# Patient Record
Sex: FEMALE
[De-identification: names, ages, dates, MRNs, and addresses within clinical notes are randomized per-mention and may not be internally consistent; named-entity substitution may affect disease eponyms.]

---

## 2018-07-03 ENCOUNTER — HOSPITAL ENCOUNTER (INPATIENT)
Dept: HOSPITAL 31 - C.ER | Age: 68
LOS: 6 days | Discharge: HOME HEALTH | DRG: 291 | End: 2018-07-09
Attending: INTERNAL MEDICINE | Admitting: INTERNAL MEDICINE
Payer: MEDICARE

## 2018-07-03 VITALS — BODY MASS INDEX: 30.4 KG/M2

## 2018-07-03 DIAGNOSIS — J18.9: ICD-10-CM

## 2018-07-03 DIAGNOSIS — D64.9: ICD-10-CM

## 2018-07-03 DIAGNOSIS — N18.6: ICD-10-CM

## 2018-07-03 DIAGNOSIS — Z94.0: ICD-10-CM

## 2018-07-03 DIAGNOSIS — I27.20: ICD-10-CM

## 2018-07-03 DIAGNOSIS — J96.90: ICD-10-CM
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
## 2018-07-03 DIAGNOSIS — Z99.2: ICD-10-CM

## 2018-07-03 DIAGNOSIS — J44.0: ICD-10-CM

## 2018-07-03 DIAGNOSIS — I50.9: ICD-10-CM

## 2018-07-03 DIAGNOSIS — D69.6: ICD-10-CM

## 2018-07-03 DIAGNOSIS — E78.00: ICD-10-CM

## 2018-07-03 DIAGNOSIS — E11.22: ICD-10-CM

## 2018-07-03 DIAGNOSIS — T82.838A: ICD-10-CM

## 2018-07-03 DIAGNOSIS — I13.2: Primary | ICD-10-CM

## 2018-07-03 LAB
ALBUMIN SERPL-MCNC: 3.9 G/DL (ref 3.5–5)
ALBUMIN/GLOB SERPL: 1.3 {RATIO} (ref 1–2.1)
ALT SERPL-CCNC: 37 U/L (ref 9–52)
APTT BLD: 32 SECONDS (ref 21–34)
AST SERPL-CCNC: 35 U/L (ref 14–36)
BASOPHILS # BLD AUTO: 0 K/UL (ref 0–0.2)
BASOPHILS NFR BLD: 0.4 % (ref 0–2)
BUN SERPL-MCNC: 31 MG/DL (ref 7–17)
CALCIUM SERPL-MCNC: 8.6 MG/DL (ref 8.6–10.4)
CK MB SERPL-MCNC: 0.74 NG/ML (ref 0–3.38)
EOSINOPHIL # BLD AUTO: 0.1 K/UL (ref 0–0.7)
EOSINOPHIL NFR BLD: 0.9 % (ref 0–4)
ERYTHROCYTE [DISTWIDTH] IN BLOOD BY AUTOMATED COUNT: 18.2 % (ref 11.5–14.5)
GFR NON-AFRICAN AMERICAN: 5
HGB BLD-MCNC: 8.6 G/DL (ref 11–16)
INR PPP: 1.1
LYMPHOCYTES # BLD AUTO: 1 K/UL (ref 1–4.3)
LYMPHOCYTES NFR BLD AUTO: 16.9 % (ref 20–40)
MCH RBC QN AUTO: 27.9 PG (ref 27–31)
MCHC RBC AUTO-ENTMCNC: 32.6 G/DL (ref 33–37)
MCV RBC AUTO: 85.4 FL (ref 81–99)
MONOCYTES # BLD: 0.2 K/UL (ref 0–0.8)
MONOCYTES NFR BLD: 3.9 % (ref 0–10)
NEUTROPHILS # BLD: 4.4 K/UL (ref 1.8–7)
NEUTROPHILS NFR BLD AUTO: 77.9 % (ref 50–75)
NRBC BLD AUTO-RTO: 0.1 % (ref 0–2)
PLATELET # BLD: 145 K/UL (ref 130–400)
PMV BLD AUTO: 9.9 FL (ref 7.2–11.7)
PROTHROMBIN TIME: 12 SECONDS (ref 9.7–12.2)
RBC # BLD AUTO: 3.07 MIL/UL (ref 3.8–5.2)
TROPONIN I SERPL-MCNC: 0.03 NG/ML (ref 0–0.12)
WBC # BLD AUTO: 5.7 K/UL (ref 4.8–10.8)

## 2018-07-03 PROCEDURE — 5A1D70Z PERFORMANCE OF URINARY FILTRATION, INTERMITTENT, LESS THAN 6 HOURS PER DAY: ICD-10-PCS | Performed by: INTERNAL MEDICINE

## 2018-07-03 PROCEDURE — 5A09457 ASSISTANCE WITH RESPIRATORY VENTILATION, 24-96 CONSECUTIVE HOURS, CONTINUOUS POSITIVE AIRWAY PRESSURE: ICD-10-PCS | Performed by: INTERNAL MEDICINE

## 2018-07-03 NOTE — CP.PCM.HP
Past Patient History





- Infectious Disease


Hx of Infectious Diseases: None





- Past Medical History & Family History


Past Medical History?: Yes





- Past Social History


Smoking Status: Never Smoked





- CARDIAC


Hx Hypercholesterolemia: Yes


Hx Hypertension: Yes





- PULMONARY


Hx Respiratory Disorders: No





- NEUROLOGICAL


Hx Neurological Disorder: No





- HEENT


Hx HEENT Problems: No





- RENAL


Other/Comment: H/D tue-thu-sat





- ENDOCRINE/METABOLIC


Hx Endocrine Disorders: No





- HEMATOLOGICAL/ONCOLOGICAL


Hx Blood Transfusions: Yes





- INTEGUMENTARY


Hx Dermatological Problems: No





- MUSCULOSKELETAL/RHEUMATOLOGICAL


Hx Musculoskeletal Disorders: Yes


Hx Falls: Yes (unstable)





- GASTROINTESTINAL


Hx Gastrointestinal Disorders: No





- GENITOURINARY/GYNECOLOGICAL


Hx Genitourinary Disorders: No


Other/Comment: hd





- PSYCHIATRIC


Hx Substance Use: No





- SURGICAL HISTORY


Hx Surgeries: Yes


Hx Kidney Transplant: Yes


Other/Comment: KIDNEY TRANSPLANT.  LEFT ARM AV SHUNT





- ANESTHESIA


Hx Anesthesia: Yes


Hx Anesthesia Reactions: No


Hx Malignant Hyperthermia: No





Meds


Allergies/Adverse Reactions: 


 Allergies











Allergy/AdvReac Type Severity Reaction Status Date / Time


 


Penicillins Allergy Severe RASH Verified 07/03/18 11:37














Physical Exam





- Constitutional


Appears: Well





- Head Exam


Head Exam: ATRAUMATIC, NORMAL INSPECTION, NORMOCEPHALIC





- Eye Exam


Eye Exam: EOMI, Normal appearance, PERRL


Pupil Exam: NORMAL ACCOMODATION, PERRL





- ENT Exam


ENT Exam: Mucous Membranes Moist, Normal Exam





- Neck Exam


Neck exam: Positive for: Normal Inspection





- Respiratory Exam


Respiratory Exam: Decreased Breath Sounds





- Cardiovascular Exam


Cardiovascular Exam: REGULAR RHYTHM, +S1, +S2





- GI/Abdominal Exam


GI & Abdominal Exam: Diminished Bowel Sounds, Soft





- Rectal Exam


Rectal Exam: Deferred





Results





- Vital Signs


Recent Vital Signs: 





 Last Vital Signs











Temp  97.2 F L  07/03/18 14:45


 


Pulse  59 L  07/03/18 14:45


 


Resp  20   07/03/18 14:45


 


BP  178/95 H  07/03/18 15:00


 


Pulse Ox  100   07/03/18 14:45














- Labs


Result Diagrams: 


 07/03/18 10:34





 07/03/18 10:34


Labs: 





 Laboratory Results - last 24 hr











  07/03/18 07/03/18





  10:34 10:34


 


WBC  5.7 


 


RBC  3.07 L 


 


Hgb  8.6 L 


 


Hct  26.2 L 


 


MCV  85.4  D 


 


MCH  27.9 


 


MCHC  32.6 L 


 


RDW  18.2 H 


 


Plt Count  145 


 


MPV  9.9 


 


Neut % (Auto)  77.9 H 


 


Lymph % (Auto)  16.9 L 


 


Mono % (Auto)  3.9 


 


Eos % (Auto)  0.9 


 


Baso % (Auto)  0.4 


 


Neut # (Auto)  4.4 


 


Lymph # (Auto)  1.0 


 


Mono # (Auto)  0.2 


 


Eos # (Auto)  0.1 


 


Baso # (Auto)  0.0 


 


Sodium   142


 


Potassium   4.6


 


Chloride   99


 


Carbon Dioxide   29


 


Anion Gap   18


 


BUN   31 H


 


Creatinine   7.5 H* D


 


Est GFR ( Amer)   7


 


Est GFR (Non-Af Amer)   5


 


Random Glucose   116 H


 


Calcium   8.6


 


Total Bilirubin   1.1


 


AST   35


 


ALT   37


 


Alkaline Phosphatase   97


 


Total Creatine Kinase   42


 


CK-MB (Mass)   0.74


 


Troponin I   0.0330


 


NT-Pro-B Natriuret Pep   59227 H


 


Total Protein   7.0


 


Albumin   3.9


 


Globulin   3.1


 


Albumin/Globulin Ratio   1.3

## 2018-07-03 NOTE — C.PDOC
History Of Present Illness





Patient BIBA from home for evaluation of SOB since yesterday.  As per EMS and 

son, she has been SOB since yesterday, worse with laying flat.  Patient is 

scheduled to have HD today, but was too SOB so 911 was called.  Patient placed 

on Bipap and given neb treatment in the field.  She denies chest pain, 

abdominal pain, nausea/vomiting, fever, cough, palpitations.


Time Seen by Provider: 07/03/18 09:29


Chief Complaint (Nursing): Shortness Of Breath


History Per: Patient, EMS, Family


History/Exam Limitations: clinical condition


Onset/Duration Of Symptoms: Days (2)


Current Symptoms Are (Timing): Still Present


Exacerbating Factor(s): Laying Flat


Current Respiratory Medications: See Home Med List


Severity: Moderate





Past Medical History


Reviewed: Historical Data, Nursing Documentation, Vital Signs


Vital Signs: 


 Last Vital Signs











Temp  97.9 F   07/05/18 07:27


 


Pulse  57 L  07/05/18 12:00


 


Resp  20   07/05/18 07:27


 


BP  132/66   07/05/18 07:27


 


Pulse Ox  100   07/05/18 13:18














- Medical History


PMH: HTN, Hypercholesterolemia, End Stage Renal Disease (on hemodialysis)





- [facility] Procedures








ASSISTANCE WITH RESPIRATORY VENTILATION, <24 HRS, CPAP (03/21/16)


PERFORMANCE OF URINARY FILTRATION, SINGLE (03/21/16)








Family History: States: No Known Family Hx





- Social History


Hx Tobacco Use: No


Hx Alcohol Use: No


Hx Substance Use: No





- Immunization History


Hx Tetanus Toxoid Vaccination: Yes


Hx Influenza Vaccination: Yes


Hx Pneumococcal Vaccination: Yes





Review Of Systems


Constitutional: Negative for: Fever, Chills


Cardiovascular: Positive for: Orthopnea.  Negative for: Chest Pain, Palpitations


Respiratory: Positive for: Shortness of Breath, Wheezing.  Negative for: Cough


Gastrointestinal: Negative for: Nausea, Vomiting, Abdominal Pain


Skin: Negative for: Rash





Physical Exam





- Physical Exam


Appears: Well, Non-toxic, In Acute Distress (in mild respiratory distress)


Head: Normacephalic


Eye(s): bilateral: Normal Inspection


Oral Mucosa: Moist


Neck: Supple


Cardiovascular: Rhythm Regular


Respiratory: Accessory Muscle Use (mild), Rales (diffuse rales B/L ), No Rhonchi

, No Wheezing


Gastrointestinal/Abdominal: Normal Exam, Bowel Sounds, Soft, No Tenderness


Extremity: Pedal Edema (trace pitting edema B/L LEs), Other (AV fistula left arm

, palpable thrill)


Pulses: Left Dorsalis Pedis: Normal, Right Dorsalis Pedis: Normal


Neurological/Psych: Oriented x3





ED Course And Treatment





- Laboratory Results


Result Diagrams: 


 07/04/18 07:38





 07/04/18 07:38


ECG: Interpreted By Me, Viewed By Me


ECG Rhythm: Sinus Rhythm


ECG Interpretation: No Acute Changes (no peaked T waves, no acute ST changes)


O2 Sat by Pulse Oximetry: 100 (BiPAP)


Pulse Ox Interpretation: Normal





- Other Rad


  ** CXR 


X-Ray: Viewed By Me, Read By Radiologist


Interpretation: Accession No. : [id number].  Patient Name / ID : LATISHA LOUISE  / [id number].  Exam Date : 07/03/2018 09:36:57 ( Approved ).

  Study Comment :  Sex / Age : F  / [id number].  Creator : Byron Galvin MD.  

Dictator : Byron Galvin MD.   :  Approver : Byron Galvin MD.  Approver2 :  Report Date : 07/03/2018 11:10:06.  My Comment :  *********

**************************************************************************.  

PROCEDURE:  CHEST RADIOGRAPH, 1 VIEW.  HISTORY:  SOB.  COMPARISON:  Chest 

radiograph dated 05/21/2017.  FINDINGS:  LUNGS:  Pulmonary vascular congestion/ 

edema.  PLEURA:  Small right and trace left pleural effusions.  No appreciable 

pneumothorax.  CARDIOVASCULAR:  Atherosclerotic aortic calcifications.  

Cardiomediastinal silhouette stably enlarged.  OSSEOUS STRUCTURES:  Unchanged.  

VISUALIZED UPPER ABDOMEN:  Normal.  OTHER FINDINGS:  None.  IMPRESSION:  

Pulmonary vascular congestion/ edema with small right and trace left pleural 

effusions.


Progress Note: Blood work, CXR, EKG ordered and reviewed.   Discussed patient 

with her PMDS/nephrologist Dr. ROMEL Cano, who will have her emergently dialyzed 

today.


Reevaluation Time: 11:15


Reassessment Condition: Improved (Patient clinically improving on Bipap, states 

she feels better, no current accessory muslce use.)





Critical Care Time





- Critical Care Note


Total Time (in mins): 35


Documented critical care: time excludes all time spent performing seperately 

billable procedures.





Disposition





- Disposition


Disposition: HOSPITALIZED


Disposition Time: 11:37


Condition: STABLE





- Clinical Impression


Clinical Impression: 


 Fluid overload, ESRD needing dialysis, CHF (congestive heart failure), Dyspnea








Decision To Admit





- Pt Status Changed To:


Hospital Disposition Of: Inpatient





- Admit Certification


Admit to Inpatient:: After my assessment, the patient will require 

hospitalization for at least two midnights.  This is because of the severity of 

symptoms shown, intensity of services needed, and/or the medical risk in this 

patient being treated as an outpatient.





- InPatient:


Physician Admission Certification: I certify that this patient requires 2 or 

more midnights of care for the following reason:: see notes





- .


Bed Request Type: Telemetry


Admitting Physician: Rosina Cano


Patient Diagnosis: 


 Fluid overload, Dyspnea, CHF (congestive heart failure), ESRD needing dialysis

## 2018-07-03 NOTE — RAD
PROCEDURE:  CHEST RADIOGRAPH, 1 VIEW



HISTORY:

SOB



COMPARISON:

Chest radiograph dated 05/21/2017.



FINDINGS:



LUNGS:

Pulmonary vascular congestion/ edema.



PLEURA:

Small right and trace left pleural effusions.  No appreciable 

pneumothorax.



CARDIOVASCULAR:

Atherosclerotic aortic calcifications.  Cardiomediastinal silhouette 

stably enlarged.



OSSEOUS STRUCTURES:

Unchanged.



VISUALIZED UPPER ABDOMEN:

Normal.



OTHER FINDINGS:

None. 



IMPRESSION:

Pulmonary vascular congestion/ edema with small right and trace left 

pleural effusions.

## 2018-07-04 LAB
ALBUMIN SERPL-MCNC: 3.5 G/DL (ref 3.5–5)
ALBUMIN/GLOB SERPL: 1.2 {RATIO} (ref 1–2.1)
ALT SERPL-CCNC: 33 U/L (ref 9–52)
AST SERPL-CCNC: 31 U/L (ref 14–36)
BASOPHILS # BLD AUTO: 0 K/UL (ref 0–0.2)
BASOPHILS NFR BLD: 0.5 % (ref 0–2)
BUN SERPL-MCNC: 19 MG/DL (ref 7–17)
CALCIUM SERPL-MCNC: 8.5 MG/DL (ref 8.6–10.4)
EOSINOPHIL # BLD AUTO: 0.2 K/UL (ref 0–0.7)
EOSINOPHIL NFR BLD: 3 % (ref 0–4)
ERYTHROCYTE [DISTWIDTH] IN BLOOD BY AUTOMATED COUNT: 19.1 % (ref 11.5–14.5)
GFR NON-AFRICAN AMERICAN: 9
HGB BLD-MCNC: 8 G/DL (ref 11–16)
LYMPHOCYTES # BLD AUTO: 1.6 K/UL (ref 1–4.3)
LYMPHOCYTES NFR BLD AUTO: 31.8 % (ref 20–40)
MCH RBC QN AUTO: 27.8 PG (ref 27–31)
MCHC RBC AUTO-ENTMCNC: 32.7 G/DL (ref 33–37)
MCV RBC AUTO: 85.1 FL (ref 81–99)
MONOCYTES # BLD: 0.3 K/UL (ref 0–0.8)
MONOCYTES NFR BLD: 6.5 % (ref 0–10)
NEUTROPHILS # BLD: 2.9 K/UL (ref 1.8–7)
NEUTROPHILS NFR BLD AUTO: 58.2 % (ref 50–75)
NRBC BLD AUTO-RTO: 0.1 % (ref 0–2)
PLATELET # BLD: 137 K/UL (ref 130–400)
PMV BLD AUTO: 9.8 FL (ref 7.2–11.7)
RBC # BLD AUTO: 2.87 MIL/UL (ref 3.8–5.2)
WBC # BLD AUTO: 5 K/UL (ref 4.8–10.8)

## 2018-07-04 NOTE — CP.PCM.CON
History of Present Illness





- History of Present Illness


History of Present Illness: 





reason for consultation: shortness of breath/ pleural effusion





68-year-old female with hypertension, end-stage renal disease on hemodial was 

admitted with worsening shortness of breath. Patient was placed on BiPAP for 

worsening shortness of breath and was given nebulizer treatment in the 

emergency room. Patient states her breathing improved after hemodialysis. 

Denies cough, denies fevers chills, denies night sweats. Chest x-ray consistent 

with small bilateral pleural effusion and congestive changes which improved 

with hemodialysis











Review of Systems





- Review of Systems


All systems: reviewed and no additional remarkable complaints except (shortness 

of breath)





Past Patient History





- Infectious Disease


Hx of Infectious Diseases: None





- Past Medical History & Family History


Past Medical History?: Yes





- Past Social History


Smoking Status: Never Smoked





- CARDIAC


Hx Hypercholesterolemia: Yes


Hx Hypertension: Yes





- PULMONARY


Hx Respiratory Disorders: No





- NEUROLOGICAL


Hx Neurological Disorder: No





- HEENT


Hx HEENT Problems: No





- RENAL


Hx Chronic Kidney Disease: Yes


Hx Dialysis: Yes


Type of Dialysis Access: AV shunt


Date of Last Dialysis Treatment: 07/03/18


Other/Comment: H/D tue-thu-sat





- ENDOCRINE/METABOLIC


Hx Endocrine Disorders: No





- HEMATOLOGICAL/ONCOLOGICAL


Hx Blood Disorders: Yes


Hx Blood Transfusions: Yes





- INTEGUMENTARY


Hx Dermatological Problems: No





- MUSCULOSKELETAL/RHEUMATOLOGICAL


Hx Musculoskeletal Disorders: Yes


Hx Falls: Yes (unstable)





- GASTROINTESTINAL


Hx Gastrointestinal Disorders: No





- GENITOURINARY/GYNECOLOGICAL


Hx Genitourinary Disorders: No


Other/Comment: hd





- PSYCHIATRIC


Hx Substance Use: No





- SURGICAL HISTORY


Hx Surgeries: Yes


Hx Kidney Transplant: Yes


Other/Comment: KIDNEY TRANSPLANT.  LEFT ARM AV SHUNT





- ANESTHESIA


Hx Anesthesia: Yes


Hx Anesthesia Reactions: No


Hx Malignant Hyperthermia: No


Has any member of the family had a problem w/ anesthesia?: No





Meds


Allergies/Adverse Reactions: 


 Allergies











Allergy/AdvReac Type Severity Reaction Status Date / Time


 


Penicillins Allergy Severe RASH Verified 07/03/18 11:37














- Medications


Medications: 


 Current Medications





Amlodipine Besylate (Norvasc)  10 mg PO DAILY [facility]


   Last Admin: 07/04/18 10:36 Dose:  10 mg


Cinacalcet (Sensipar)  60 mg PO ACD [facility]


   Last Admin: 07/03/18 22:55 Dose:  Not Given


Famotidine (Pepcid)  20 mg PO DAILY [facility]


   Last Admin: 07/04/18 10:36 Dose:  20 mg


Furosemide (Lasix)  80 mg PO DAILY [facility]


   Last Admin: 07/04/18 10:36 Dose:  80 mg


Heparin Sodium (Porcine) (Heparin)  5,000 units SC Q12 [facility]


Azithromycin 500 mg/ Sodium (Chloride)  250 mls @ 250 mls/hr IVPB Q24H [facility]


   PRN Reason: Protocol


   Last Admin: 07/03/18 21:20 Dose:  250 mls/hr


Isosorbide Mononitrate (Imdur)  120 mg PO DAILY [facility]


   Last Admin: 07/04/18 10:35 Dose:  120 mg











Physical Exam





- Head Exam


Head Exam: ATRAUMATIC, NORMOCEPHALIC





- ENT Exam


ENT Exam: Mucous Membranes Moist





- Neck Exam


Neck exam: Positive for: Normal Inspection





- Respiratory Exam


Respiratory Exam: Clear to Auscultation Bilateral





- Cardiovascular Exam


Cardiovascular Exam: REGULAR RHYTHM





- GI/Abdominal Exam


GI & Abdominal Exam: Normal Bowel Sounds, Soft





Results





- Vital Signs


Recent Vital Signs: 


 Last Vital Signs











Temp  98.4 F   07/04/18 15:00


 


Pulse  54 L  07/04/18 16:02


 


Resp  20   07/04/18 15:00


 


BP  136/65   07/04/18 15:00


 


Pulse Ox  96   07/04/18 15:00














- Labs


Result Diagrams: 


 07/04/18 07:38





 07/04/18 07:38


Labs: 


 Laboratory Results - last 24 hr











  07/04/18 07/04/18





  07:38 07:38


 


WBC  5.0 


 


RBC  2.87 L 


 


Hgb  8.0 L 


 


Hct  24.4 L 


 


MCV  85.1 


 


MCH  27.8 


 


MCHC  32.7 L 


 


RDW  19.1 H 


 


Plt Count  137 


 


MPV  9.8 


 


Neut % (Auto)  58.2 


 


Lymph % (Auto)  31.8 


 


Mono % (Auto)  6.5 


 


Eos % (Auto)  3.0 


 


Baso % (Auto)  0.5 


 


Neut # (Auto)  2.9 


 


Lymph # (Auto)  1.6 


 


Mono # (Auto)  0.3 


 


Eos # (Auto)  0.2 


 


Baso # (Auto)  0.0 


 


Sodium   143


 


Potassium   4.2


 


Chloride   102


 


Carbon Dioxide   31 H


 


Anion Gap   14


 


BUN   19 H


 


Creatinine   4.8 H


 


Est GFR ( Amer)   11


 


Est GFR (Non-Af Amer)   9


 


Random Glucose   83


 


Calcium   8.5 L


 


Total Bilirubin   0.9


 


AST   31


 


ALT   33


 


Alkaline Phosphatase   86


 


Total Protein   6.4


 


Albumin   3.5


 


Globulin   2.9


 


Albumin/Globulin Ratio   1.2














Assessment & Plan


(1) Pleural effusion


Status: Acute   


Comment: secondary to renal failure and improved with hemodialysis.  No further 

intervention necessary   





(2) ESRD (end stage renal disease) on dialysis


Status: Acute   


Comment: continue hemodialysis.  Renal diet   





(3) Respiratory distress


Status: Acute

## 2018-07-04 NOTE — CP.PCM.PN
Subjective





- Date & Time of Evaluation


Date of Evaluation: 07/04/18


Time of Evaluation: 08:20





- Subjective


Subjective: 


clinically same





Objective





- Vital Signs/Intake and Output


Vital Signs (last 24 hours): 


 











Temp Pulse Resp BP Pulse Ox


 


 97.9 F   58 L  18   148/70   100 


 


 07/04/18 07:25  07/04/18 13:41  07/04/18 07:25  07/04/18 13:41  07/04/18 08:33











- Medications


Medications: 


 Current Medications





Amlodipine Besylate (Norvasc)  10 mg PO DAILY [facility]


   Last Admin: 07/04/18 10:36 Dose:  10 mg


Cinacalcet (Sensipar)  60 mg PO ACD [facility]


   Last Admin: 07/03/18 22:55 Dose:  Not Given


Famotidine (Pepcid)  20 mg PO DAILY [facility]


   Last Admin: 07/04/18 10:36 Dose:  20 mg


Furosemide (Lasix)  80 mg PO DAILY [facility]


   Last Admin: 07/04/18 10:36 Dose:  80 mg


Heparin Sodium (Porcine) (Heparin)  5,000 units SC BID [facility]


Azithromycin 500 mg/ Sodium (Chloride)  250 mls @ 250 mls/hr IVPB Q24H [facility]


   PRN Reason: Protocol


   Last Admin: 07/03/18 21:20 Dose:  250 mls/hr


Isosorbide Mononitrate (Imdur)  120 mg PO DAILY [facility]


   Last Admin: 07/04/18 10:35 Dose:  120 mg











- Labs


Labs: 


 





 07/04/18 07:38 





 07/04/18 07:38 





 











PT  12.0 SECONDS (9.7-12.2)   07/03/18  15:38    


 


INR  1.1   07/03/18  15:38    


 


APTT  32 SECONDS (21-34)   07/03/18  15:38    














- Constitutional


Appears: Well





- Head Exam


Head Exam: ATRAUMATIC, NORMAL INSPECTION, NORMOCEPHALIC





- Eye Exam


Eye Exam: EOMI, Normal appearance, PERRL


Pupil Exam: NORMAL ACCOMODATION, PERRL





- ENT Exam


ENT Exam: Mucous Membranes Moist, Normal Exam





- Neck Exam


Neck Exam: Full ROM, Normal Inspection.  absent: Lymphadenopathy





- Respiratory Exam


Respiratory Exam: Decreased Breath Sounds





- Cardiovascular Exam


Cardiovascular Exam: REGULAR RHYTHM, +S1, +S2





- GI/Abdominal Exam


GI & Abdominal Exam: Soft, Diminished Bowel Sounds





- Rectal Exam


Rectal Exam: Deferred

## 2018-07-04 NOTE — RAD
HISTORY:

SOB  



COMPARISON:

No prior.



TECHNIQUE:

Chest PA and lateral



FINDINGS:



LUNGS:

Decreased pulmonary vascular congestion.  Bibasilar atelectasis.



PLEURA:

Decreased size of small/trace residual pleural effusions. No 

pneumothorax apparent.



CARDIOVASCULAR:

Atherosclerotic aortic calcifications.  Cardiomediastinal silhouette 

stably enlarged.



OSSEOUS STRUCTURES:

Unchanged.



VISUALIZED UPPER ABDOMEN:

Normal.



OTHER FINDINGS:

None.



IMPRESSION:

Decreased pulmonary vascular congestion with decreased size of 

small/trace residual pleural effusions.

## 2018-07-04 NOTE — CON
DATE:  07/04/2018



CARDIOLOGY CONSULTATION



REASON FOR CONSULTATION:  Volume overload.



The history was obtained from the patient's son on the phone.



HISTORY OF PRESENT ILLNESS:  The patient is a 68 years old [place] female

who has a history of end-stage renal disease, on hemodialysis for the past

six, now scheduled Tuesday, Thursday and Saturday and according to the son,

she never missed dialysis or had to have an extra dialysis because of

volume overload in the past, and the son is unaware of any prior history of

heart attack.  Last time, she saw cardiologist, Dr. Bogdan Pace, last year.

The patient's shortness of breath has improved after she received

hemodialysis.  She denies any retrosternal chest pain.



SOCIAL HISTORY:  Nonsmoker, nondrinker.  She lives with her son.



MEDICATIONS:  Zithromax 500 mg intravenously daily, clonidine 0.1 mg

t.i.d., Coreg 6.25 mg twice a day, heparin 5000 units subcutaneous twice a

day, Imdur 120 mg once a day, Lasix 80 mg once a day, Norvasc 10 mg once a

day, Pepcid 20 mg once a day.



REVIEW OF SYSTEMS:  No fever or chills.  No productive cough.  No dizziness

or syncope.



PAST MEDICAL HISTORY:  Hypertension; end-stage renal disease, on

hemodialysis.  No history of stroke or heart attack.



PHYSICAL EXAMINATION:

GENERAL:  The patient is an elderly female who does not appear to be in

acute distress.

VITAL SIGNS:  Blood pressure 145/70, heart rate 51, temperature 97.9,

respirations 18.

HEENT:  Pale conjunctivae.

CHEST:  Minimal basilar rhonchi.

HEART:  S1, S2 are regular.

ABDOMEN:  Soft.

EXTREMITIES:  No pedal edema.  Significant varicose veins in the right

lower leg.



LABORATORY DATA:  Today's hemoglobin and hematocrit 8 and 24.4, white count

and platelet count are within normal limit.  SMA-7 today:  Sodium 143,

potassium 4.2, chloride 102, CO2 of 31, glucose 83, BUN 19, creatinine 4.8.

ProBNP is 33,700.  One set of troponin is negative.  PT, PTT and INR are

within normal limits.  Echocardiographic study performed in 05/2017

revealed mild dilated left ventricle with mild-to-moderate concentric LVH

and normal ejection fraction with severe pulmonary hypertension.  Admitting

chest x-ray revealed right lower and middle lobe dense infiltrates with

bilateral pleural effusion.  The official report stated pulmonary vascular

congestion with small right and trace left pleural effusion.



ASSESSMENT:

1.  Status post volume overload.

2.  Severe pulmonary hypertension.

3.  Rule out bilateral pneumonia.

4.  Hypertension.



RECOMMENDATIONS:  Continue current IV Zithromax 500 mg daily.  Continue

Imdur 120 mg once a day, Norvasc 10 mg once a day.  Hold both Coreg and

clonidine because of ongoing CHF as well as bradycardia.  I will obtain

chest x-ray, AP and lateral.  If lower lobe infiltrate persists, I will

consider chest CT scan without contrast.  I will obtain 12-lead EKG as no

EKG could be found in the chart or [facility] database.





__________________________________________

Lex Barraza MD





DD:  07/04/2018 14:10:51

DT:  07/04/2018 14:12:59

Job # [id number]

## 2018-07-05 NOTE — CP.PCM.PN
Subjective





- Date & Time of Evaluation


Date of Evaluation: 07/05/18


Time of Evaluation: 08:20





- Subjective


Subjective: 


clinically same





Objective





- Vital Signs/Intake and Output


Vital Signs (last 24 hours): 


 











Temp Pulse Resp BP Pulse Ox


 


 98.2 F   56 L  17   179/91 H  96 


 


 07/05/18 14:45  07/05/18 16:21  07/05/18 16:21  07/05/18 16:21  07/05/18 16:21











- Medications


Medications: 


 Current Medications





Amlodipine Besylate (Norvasc)  10 mg PO DAILY [facility]


   Last Admin: 07/05/18 09:13 Dose:  Not Given


Cinacalcet (Sensipar)  60 mg PO ACD [facility]


   Last Admin: 07/04/18 17:22 Dose:  Not Given


Famotidine (Pepcid)  20 mg PO DAILY [facility]


   Last Admin: 07/05/18 09:09 Dose:  20 mg


Furosemide (Lasix)  80 mg PO DAILY [facility]


   Last Admin: 07/05/18 09:13 Dose:  Not Given


Heparin Sodium (Porcine) (Heparin)  5,000 units SC Q12 [facility]


   Last Admin: 07/05/18 09:09 Dose:  5,000 units


Azithromycin 500 mg/ Sodium (Chloride)  250 mls @ 250 mls/hr IVPB Q24H [facility]


   PRN Reason: Protocol


   Last Admin: 07/04/18 21:25 Dose:  250 mls/hr


Isosorbide Mononitrate (Imdur)  120 mg PO DAILY [facility]


   Last Admin: 07/05/18 09:13 Dose:  Not Given











- Labs


Labs: 


 





 07/04/18 07:38 





 07/04/18 07:38 





 











PT  12.0 SECONDS (9.7-12.2)   07/03/18  15:38    


 


INR  1.1   07/03/18  15:38    


 


APTT  32 SECONDS (21-34)   07/03/18  15:38    














- Constitutional


Appears: Well





- Head Exam


Head Exam: ATRAUMATIC, NORMAL INSPECTION, NORMOCEPHALIC





- Eye Exam


Eye Exam: EOMI, Normal appearance, PERRL


Pupil Exam: NORMAL ACCOMODATION, PERRL





- ENT Exam


ENT Exam: Mucous Membranes Moist, Normal Exam





- Neck Exam


Neck Exam: Full ROM, Normal Inspection.  absent: Lymphadenopathy





- Respiratory Exam


Respiratory Exam: Decreased Breath Sounds





- Cardiovascular Exam


Cardiovascular Exam: REGULAR RHYTHM, +S1, +S2





- GI/Abdominal Exam


GI & Abdominal Exam: Soft, Diminished Bowel Sounds





- Rectal Exam


Rectal Exam: Deferred

## 2018-07-05 NOTE — PN
DATE:  07/05/2018



SUBJECTIVE:  I did discuss the case with Dr. Bogdan Pace who is _____

cardiologist, who asked me to continue care with the patient.  The

patient's shortness of breath has improved.



PHYSICAL EXAMINATION:

VITAL SIGNS:  Blood pressure 132/66, heart rate 53, and temperature 97.9.

HEENT:  Normocephalic.

CHEST:  Absent breath sounds over the bases.

HEART:  S1 and S2 regular.

ABDOMEN:  Soft.

EXTREMITIES:  No edema.  Significant right leg varicose veins.



LABORATORY DATA:  Chest x-ray revealed improvement of the bilateral lower

lobe infiltrate with some residual right lower and right middle lobe

infiltrate.  The official report of the x-ray stated decreased pulmonary

vascular congestion with decreased size of the _____ pleural effusion.



ASSESSMENT:

1.  Status post volume overload.

2.  End-stage renal disease, on hemodialysis.

3.  Small bilateral pleural effusion.

4.  Severe pulmonary hypertension.

5.  Systemic hypertension.



RECOMMENDATIONS:  Continue subcutaneous heparin 5000 units every 12 hours,

Imdur 120 mg once daily, Lasix 80 mg once a day, and Norvasc 10 mg once a

day.  The patient will undergo hemodialysis today.







__________________________________________

Lex Barraza MD



DD:  07/05/2018 13:27:04

DT:  07/05/2018 14:50:22

Job # [id number]

## 2018-07-06 NOTE — CP.PCM.PN
Subjective





- Date & Time of Evaluation


Date of Evaluation: 07/06/18


Time of Evaluation: 08:00





- Subjective


Subjective: 


clinically same





Objective





- Vital Signs/Intake and Output


Vital Signs (last 24 hours): 


 











Temp Pulse Resp BP Pulse Ox


 


 99 F   59 L  20   154/68 H  96 


 


 07/06/18 15:59  07/06/18 18:09  07/06/18 15:59  07/06/18 15:59  07/06/18 15:59








Intake and Output: 


 











 07/06/18 07/07/18





 18:59 06:59


 


Intake Total 480 


 


Balance 480 














- Medications


Medications: 


 Current Medications





Acetaminophen (Tylenol 325mg Tab)  650 mg PO Q6 PRN


   PRN Reason: Pain, Mild (1-3)


   Last Admin: 07/05/18 21:15 Dose:  650 mg


Amlodipine Besylate (Norvasc)  10 mg PO DAILY [facility]


   Last Admin: 07/06/18 09:04 Dose:  10 mg


Cinacalcet (Sensipar)  60 mg PO ACD [facility]


   Last Admin: 07/06/18 17:45 Dose:  60 mg


Famotidine (Pepcid)  20 mg PO DAILY [facility]


   Last Admin: 07/06/18 09:05 Dose:  20 mg


Furosemide (Lasix)  80 mg PO DAILY [facility]


   Last Admin: 07/06/18 09:04 Dose:  80 mg


Heparin Sodium (Porcine) (Heparin)  5,000 units SC Q12 [facility]


   Last Admin: 07/06/18 09:06 Dose:  5,000 units


Azithromycin 500 mg/ Sodium (Chloride)  250 mls @ 250 mls/hr IVPB Q24H [facility]


   PRN Reason: Protocol


   Last Admin: 07/05/18 21:15 Dose:  250 mls/hr


Isosorbide Mononitrate (Imdur)  120 mg PO DAILY [facility]


   Last Admin: 07/06/18 09:04 Dose:  120 mg


Ondansetron HCl (Zofran Inj)  4 mg IVP Q6 PRN


   PRN Reason: Nausea/Vomiting


   Last Admin: 07/05/18 21:15 Dose:  4 mg











- Labs


Labs: 


 





 07/04/18 07:38 





 07/04/18 07:38 





 











PT  12.0 SECONDS (9.7-12.2)   07/03/18  15:38    


 


INR  1.1   07/03/18  15:38    


 


APTT  32 SECONDS (21-34)   07/03/18  15:38    














- Constitutional


Appears: Well





- Head Exam


Head Exam: ATRAUMATIC, NORMAL INSPECTION, NORMOCEPHALIC





- Eye Exam


Eye Exam: EOMI, Normal appearance, PERRL


Pupil Exam: NORMAL ACCOMODATION, PERRL





- ENT Exam


ENT Exam: Mucous Membranes Moist, Normal Exam





- Neck Exam


Neck Exam: Full ROM, Normal Inspection.  absent: Lymphadenopathy





- Respiratory Exam


Respiratory Exam: Decreased Breath Sounds





- Cardiovascular Exam


Cardiovascular Exam: REGULAR RHYTHM, +S1, +S2





- GI/Abdominal Exam


GI & Abdominal Exam: Soft, Diminished Bowel Sounds





- Rectal Exam


Rectal Exam: Deferred





Assessment and Plan





- Assessment and Plan (Free Text)


Plan: 





Spoke to the son


Patient still has nausea and vomiting


We will go Zofran before dialysis


Continue current medication


Continue IV Zithromax


Patient is going for echocardiogram


Discussed with the plan decided to do the CT scan of the chest


Chest x-ray improved improved compared to the previous 1


CT chest revealed patchy alveolar edema in the lungs with moderate cardiomegaly 

with small right pleural effusion

## 2018-07-06 NOTE — PN
DATE:  07/06/2018



FOLLOWUP NOTE



SUBJECTIVE:  The patient is still experiencing shortness of breath and

productive cough.



PHYSICAL EXAMINATION:

VITAL SIGNS:  Blood pressure 154/68, heart rate 69, temperature 99,

respirations 20.

HEENT:  Pale conjunctivae.

CHEST:  Minimal basilar rhonchi and rales.

HEART:  S1 and S2, regular.

ABDOMEN:  Soft.

EXTREMITIES:  No edema.



ASSESSMENT:

1.  Status post volume overload.

2.  Consider myocardial infarction and bilateral pneumonia.

3.  End-stage renal disease, on hemodialysis.

4.  Severe pulmonary hypertension.

5.  Systemic hypertension.



RECOMMENDATIONS:  I _____ Dr. Cano and I ordered chest CT scan without

contrast.  The findings were consistent with patchy alveolar pulmonary

edema in the lungs with mild interstitial pulmonary edema.  Small right

pleural effusion and fluid in the minor fissure.  More confluent airspace

disease in the right lower lobe, may represent subsegmental

atelectasis/pneumonia.  Continue current IV Zithromax at 500 mg daily. 

Subcutaneous heparin 5000 units every 12 hours, Lasix 80 mg orally once a

day, Imdur 120 mg once a day.  The patient will undergo _____ hemodialysis

tomorrow.





__________________________________________

Lex Barraza MD



DD:  07/06/2018 16:18:23

DT:  07/06/2018 20:00:58

Job # [id number]

## 2018-07-06 NOTE — CT
PROCEDURE:  CT Chest without contrast



HISTORY:

Shortness of breath 



COMPARISON:

Plain radiographs from 07/04/2018.



TECHNIQUE:

Contiguous axial images were obtained through the chest without 

intravenous contrast enhancement. Sagittal and coronal 

reconstructions were performed.







Radiation dose (DLP): 751.60 mGy-cm. 



This CT exam was performed using one or more of the following dose 

reduction techniques: Automated exposure control, adjustment of the 

mA and/or kV according to patient size, and/or use of iterative 

reconstruction technique.



FINDINGS:



LUNGS:

The lungs are well inflated.  There is patchy ground-glass 

attenuation in both lungs with interlobular septal thickening. There 

is more confluent airspace disease in the right lateral lung base. 



MEDIASTINUM:

The aorta is not dilated. The pulmonary artery is enlarged and 

measures 4.3 cm. There is moderate cardiomegaly. Trace pericardial 

effusion.



PLEURA:

Small right pleural effusion and fluid in the minor fissure. No 

pneumothorax.



BONES:

No fracture. No destructive lesion.  There is diffuse bone 

demineralization and multilevel degenerative changes in the spine.



UPPER ABDOMEN:

Grossly unremarkable.



OTHER FINDINGS:

None.



IMPRESSION:

1. Findings are most compatible with patchy alveolar pulmonary edema 

in the lungs. Also noted is mild interstitial pulmonary edema. 



2. Moderate cardiomegaly, small right pleural effusion and fluid in 

the minor fissure. 



3. More confluent airspace disease in the right lower lobe may 

represent subsegmental atelectasis/pneumonia.  Follow-up is advised.

## 2018-07-07 LAB
BASOPHILS # BLD AUTO: 0 K/UL (ref 0–0.2)
BASOPHILS NFR BLD: 0.6 % (ref 0–2)
BUN SERPL-MCNC: 29 MG/DL (ref 7–17)
CALCIUM SERPL-MCNC: 8.3 MG/DL (ref 8.6–10.4)
EOSINOPHIL # BLD AUTO: 0.1 K/UL (ref 0–0.7)
EOSINOPHIL NFR BLD: 2.6 % (ref 0–4)
ERYTHROCYTE [DISTWIDTH] IN BLOOD BY AUTOMATED COUNT: 18.4 % (ref 11.5–14.5)
GFR NON-AFRICAN AMERICAN: 7
HGB BLD-MCNC: 8.3 G/DL (ref 11–16)
LYMPHOCYTES # BLD AUTO: 1.5 K/UL (ref 1–4.3)
LYMPHOCYTES NFR BLD AUTO: 29.1 % (ref 20–40)
MCH RBC QN AUTO: 28.1 PG (ref 27–31)
MCHC RBC AUTO-ENTMCNC: 33.1 G/DL (ref 33–37)
MCV RBC AUTO: 85 FL (ref 81–99)
MONOCYTES # BLD: 0.3 K/UL (ref 0–0.8)
MONOCYTES NFR BLD: 6.3 % (ref 0–10)
NEUTROPHILS # BLD: 3.2 K/UL (ref 1.8–7)
NEUTROPHILS NFR BLD AUTO: 61.4 % (ref 50–75)
NRBC BLD AUTO-RTO: 0 % (ref 0–2)
PLATELET # BLD: 127 K/UL (ref 130–400)
PMV BLD AUTO: 9.8 FL (ref 7.2–11.7)
RBC # BLD AUTO: 2.96 MIL/UL (ref 3.8–5.2)
WBC # BLD AUTO: 5.1 K/UL (ref 4.8–10.8)

## 2018-07-07 PROCEDURE — 5A1D70Z PERFORMANCE OF URINARY FILTRATION, INTERMITTENT, LESS THAN 6 HOURS PER DAY: ICD-10-PCS | Performed by: INTERNAL MEDICINE

## 2018-07-07 NOTE — CP.PCM.PN
Subjective





- Date & Time of Evaluation


Date of Evaluation: 07/07/18


Time of Evaluation: 18:00





- Subjective


Subjective: 





Pulmonary, Covering Dr. Pimentel


The Patient was seen and examined at the bedside, 





Medical records reviewed and management issues were discussed and formulated 

with the house staff.


Events reviewed





68 Years old Female with PMHx of HTN, Hypercholesterolemia and End Stage Renal 

Disease (on hemodialysis)


Who was BIBA from home on 07/03 for evaluation of SOB x 1 day, As per EMS and 

son, she has been SOB since yesterday, worse with laying flat, Patient is 

scheduled to have HD today, but was too SOB so 911 was called.  


Patient placed on Bipap and given neb treatment in the field.  


In the ER, Chest x-ray consistent with small bilateral pleural effusion and 

congestive changes,


Consent for special procedures obtained from the patient to receive dialysis.


 


Patient admitted for further mangement of Dyspnea from  Fluid overload, ESRD 

needing dialysis and acute congestive heart failure.


She is doing better, denies chest pain, abdominal pain, nausea/vomiting, fever, 

cough, palpitations.


Less SOB


CXR: improved with hemodialysis








Social Hx: Smoking Status: Never Smoked


Echo 03/2016: Normal LVEF and wall motion, Mild LAE, Mild LVH, MOD TR, Mild MR 

and PI; Moderate inc in PASP around 47mmHf with grade 1 diastolic dysfunction 

and mild LAE. 


No pericardial effusion and normal RV size and function.











Objective





- Vital Signs/Intake and Output


Vital Signs (last 24 hours): 


 











Temp Pulse Resp BP Pulse Ox


 


 97.4 F L  61   19   186/84 H  97 


 


 07/07/18 15:35  07/07/18 18:35  07/07/18 18:35  07/07/18 18:35  07/07/18 15:35











- Medications


Medications: 


 Current Medications





Acetaminophen (Tylenol 325mg Tab)  650 mg PO Q6 PRN


   PRN Reason: Pain, Mild (1-3)


   Last Admin: 07/05/18 21:15 Dose:  650 mg


Amlodipine Besylate (Norvasc)  10 mg PO DAILY [facility]


   Last Admin: 07/07/18 10:30 Dose:  10 mg


Cinacalcet (Sensipar)  60 mg PO ACD [facility]


   Last Admin: 07/07/18 19:53 Dose:  60 mg


Famotidine (Pepcid)  20 mg PO DAILY [facility]


   Last Admin: 07/07/18 10:30 Dose:  20 mg


Furosemide (Lasix)  80 mg PO DAILY [facility]


   Last Admin: 07/07/18 11:00 Dose:  80 mg


Heparin Sodium (Porcine) (Heparin)  5,000 units SC Q12 [facility]


   Last Admin: 07/07/18 22:14 Dose:  5,000 units


Azithromycin 500 mg/ Sodium (Chloride)  250 mls @ 250 mls/hr IVPB Q24H [facility]


   PRN Reason: Protocol


   Last Admin: 07/07/18 22:14 Dose:  250 mls/hr


Isosorbide Mononitrate (Imdur)  120 mg PO DAILY [facility]


   Last Admin: 07/07/18 10:59 Dose:  120 mg


Losartan Potassium (Cozaar)  50 mg PO DAILY [facility]


   Last Admin: 07/07/18 10:59 Dose:  50 mg


Ondansetron HCl (Zofran Inj)  4 mg IVP Q6 PRN


   PRN Reason: Nausea/Vomiting


   Last Admin: 07/05/18 21:15 Dose:  4 mg











- Labs


Labs: 


 





 07/07/18 15:44 





 07/07/18 15:44 





 











PT  12.0 SECONDS (9.7-12.2)   07/03/18  15:38    


 


INR  1.1   07/03/18  15:38    


 


APTT  32 SECONDS (21-34)   07/03/18  15:38    














- Constitutional


Appears: Well, Non-toxic





- Head Exam


Head Exam: ATRAUMATIC, NORMAL INSPECTION, NORMOCEPHALIC





- Eye Exam


Eye Exam: EOMI, Normal appearance


Pupil Exam: NORMAL ACCOMODATION, PERRL





- ENT Exam


ENT Exam: Mucous Membranes Moist





- Neck Exam


Neck Exam: Full ROM.  absent: Lymphadenopathy, Thyromegaly





- Respiratory Exam


Respiratory Exam: Decreased Breath Sounds, Rales.  absent: Accessory Muscle Use

, Chest Wall Tenderness, Clear to Ausculation Bilateral, Prolonged Expiratory 

Phase, Rhonchi, Wheezes, Respiratory Distress





- Cardiovascular Exam


Cardiovascular Exam: REGULAR RHYTHM, RRR.  absent: Bradycardia, Tachycardia, JVD





- GI/Abdominal Exam


GI & Abdominal Exam: Soft, Normal Bowel Sounds.  absent: Distended, Firm, 

Guarding, Rigid





- Extremities Exam


Extremities Exam: Normal Capillary Refill, Pedal Edema.  absent: Joint Swelling

, Tenderness





- Back Exam


Back Exam: absent: CVA tenderness (L), CVA tenderness (R)





- Neurological Exam


Neurological Exam: Alert, Awake





Assessment and Plan


(1) Fluid overload


Assessment & Plan: 


HEMODIALYSIS AS PER RENAL


CXR: IMPROVED 


Status: Acute   





(2) Acute diastolic (congestive) heart failure


Assessment & Plan: 





Isosorbide Mononitrate (Imdur)  120 mg PO DAILY


Furosemide (Lasix)  80 mg PO DAILY


Amlodipine Besylate (Norvasc)  10 mg PO DAILY


Status: Acute   





(3) Pulmonary hypertension


Assessment & Plan: 


Echo 03/2016: Normal LVEF and wall motion, Mild LAE, Mild LVH, MOD TR, Mild MR 

and PI; Moderate inc in PASP around 47mmHf with grade 1 diastolic dysfunction 

and mild LAE. 


No pericardial effusion and normal RV size and function.


Status: Acute   





(4) Pleural effusion


Assessment & Plan: 


secondary to renal failure and improved with hemodialysis.  No further 

intervention necessary  


Status: Acute   





(5) Dyspnea


Status: Acute   





(6) ESRD needing dialysis


Status: Acute

## 2018-07-07 NOTE — PN
DATE:  07/07/2018



SUBJECTIVE:  The patient is currently undergoing hemodialysis.  She denies

any chest pain.



PHYSICAL EXAMINATION:

VITAL SIGNS:  Blood pressure 172/79, heart rate 59, respirations 20,

temperature 97.4.

HEENT:  Pale conjunctivae.

CHEST:  Absent breath sounds over the right base.

HEART:  S1 and S2, regular.

EXTREMITIES:  No edema.



LABORATORY DATA:  SMA-7 today:  Sodium 137, potassium 5.3, chloride 97, CO2

is 28, glucose 100, BUN 29, creatinine 6.1.  Today's hemoglobin and

hematocrit 8.3 and 25.2, white count 5.1, platelet count 127,000.  Official

report of the chest CT scan without contrast, findings most compatible with

patchy alveolar pulmonary edema in the lungs.  Also noted is mild

interstitial pulmonary edema.  Moderate cardiomegaly.  Small right pleural

effusion and fluid in the minor fissure.  More confluent airspace disease

in the right lower lobe may represent subsegmental atelectasis/pneumonia.



ASSESSMENT:

1.  Status post volume overload.

2.  Consider right lower lobe pneumonia.

3.  Endstage renal disease, on hemodialysis.

4.  Anemia, very mild thrombocytopenia.

5.  Systemic hypertension.



RECOMMENDATIONS:  Continue current IV Zithromax at 500 mg daily, Cozaar 50

mg once a day, subcutaneous heparin 5000 international units twice a day,

Imdur at 120 mg once a day, Lasix 80 mg intravenously once a day, Norvasc

10 mg once a day, Pepcid 20 mg once a day.





__________________________________________

Lex Barraza MD





DD:  07/07/2018 17:31:28

DT:  07/07/2018 20:53:26

Job # [id number]

## 2018-07-07 NOTE — CP.PCM.PN
Subjective





- Date & Time of Evaluation


Date of Evaluation: 07/07/18


Time of Evaluation: 08:00





- Subjective


Subjective: 


clinically same





Objective





- Vital Signs/Intake and Output


Vital Signs (last 24 hours): 


 











Temp Pulse Resp BP Pulse Ox


 


 97.4 F L  61   19   186/84 H  97 


 


 07/07/18 15:35  07/07/18 18:35  07/07/18 18:35  07/07/18 18:35  07/07/18 15:35











- Medications


Medications: 


 Current Medications





Acetaminophen (Tylenol 325mg Tab)  650 mg PO Q6 PRN


   PRN Reason: Pain, Mild (1-3)


   Last Admin: 07/05/18 21:15 Dose:  650 mg


Amlodipine Besylate (Norvasc)  10 mg PO DAILY [facility]


   Last Admin: 07/07/18 10:30 Dose:  10 mg


Cinacalcet (Sensipar)  60 mg PO ACD [facility]


   Last Admin: 07/07/18 19:53 Dose:  60 mg


Famotidine (Pepcid)  20 mg PO DAILY [facility]


   Last Admin: 07/07/18 10:30 Dose:  20 mg


Furosemide (Lasix)  80 mg PO DAILY [facility]


   Last Admin: 07/07/18 11:00 Dose:  80 mg


Heparin Sodium (Porcine) (Heparin)  5,000 units SC Q12 [facility]


   Last Admin: 07/07/18 10:20 Dose:  5,000 units


Azithromycin 500 mg/ Sodium (Chloride)  250 mls @ 250 mls/hr IVPB Q24H [facility]


   PRN Reason: Protocol


   Last Admin: 07/06/18 21:19 Dose:  250 mls/hr


Isosorbide Mononitrate (Imdur)  120 mg PO DAILY [facility]


   Last Admin: 07/07/18 10:59 Dose:  120 mg


Losartan Potassium (Cozaar)  50 mg PO DAILY [facility]


   Last Admin: 07/07/18 10:59 Dose:  50 mg


Ondansetron HCl (Zofran Inj)  4 mg IVP Q6 PRN


   PRN Reason: Nausea/Vomiting


   Last Admin: 07/05/18 21:15 Dose:  4 mg











- Labs


Labs: 


 





 07/07/18 15:44 





 07/07/18 15:44 





 











PT  12.0 SECONDS (9.7-12.2)   07/03/18  15:38    


 


INR  1.1   07/03/18  15:38    


 


APTT  32 SECONDS (21-34)   07/03/18  15:38    














- Constitutional


Appears: Well





- Head Exam


Head Exam: ATRAUMATIC, NORMAL INSPECTION, NORMOCEPHALIC





- Eye Exam


Eye Exam: EOMI, Normal appearance, PERRL


Pupil Exam: NORMAL ACCOMODATION, PERRL





- ENT Exam


ENT Exam: Mucous Membranes Moist, Normal Exam





- Neck Exam


Neck Exam: Full ROM, Normal Inspection.  absent: Lymphadenopathy





- Respiratory Exam


Respiratory Exam: Decreased Breath Sounds





- Cardiovascular Exam


Cardiovascular Exam: REGULAR RHYTHM, +S1, +S2





- GI/Abdominal Exam


GI & Abdominal Exam: Soft, Diminished Bowel Sounds





- Rectal Exam


Rectal Exam: Deferred

## 2018-07-08 NOTE — CP.PCM.PN
Subjective





- Date & Time of Evaluation


Date of Evaluation: 07/08/18


Time of Evaluation: 08:20





- Subjective


Subjective: 


clinically same





Objective





- Vital Signs/Intake and Output


Vital Signs (last 24 hours): 


 











Temp Pulse Resp BP Pulse Ox


 


 98.1 F   61   20   153/85 H  96 


 


 07/08/18 08:40  07/08/18 08:40  07/08/18 08:40  07/08/18 08:40  07/08/18 08:40











- Medications


Medications: 


 Current Medications





Acetaminophen (Tylenol 325mg Tab)  650 mg PO Q6 PRN


   PRN Reason: Pain, Mild (1-3)


   Last Admin: 07/05/18 21:15 Dose:  650 mg


Amlodipine Besylate (Norvasc)  10 mg PO DAILY [facility]


   Last Admin: 07/07/18 10:30 Dose:  10 mg


Cinacalcet (Sensipar)  60 mg PO ACD [facility]


   Last Admin: 07/07/18 19:53 Dose:  60 mg


Famotidine (Pepcid)  20 mg PO DAILY [facility]


   Last Admin: 07/07/18 10:30 Dose:  20 mg


Furosemide (Lasix)  80 mg PO DAILY [facility]


   Last Admin: 07/07/18 11:00 Dose:  80 mg


Heparin Sodium (Porcine) (Heparin)  5,000 units SC Q12 [facility]


   Last Admin: 07/07/18 22:14 Dose:  5,000 units


Azithromycin 500 mg/ Sodium (Chloride)  250 mls @ 250 mls/hr IVPB Q24H [facility]


   PRN Reason: Protocol


   Last Admin: 07/07/18 22:14 Dose:  250 mls/hr


Isosorbide Mononitrate (Imdur)  120 mg PO DAILY [facility]


   Last Admin: 07/07/18 10:59 Dose:  120 mg


Losartan Potassium (Cozaar)  50 mg PO DAILY [facility]


   Last Admin: 07/07/18 10:59 Dose:  50 mg


Ondansetron HCl (Zofran Inj)  4 mg IVP Q6 PRN


   PRN Reason: Nausea/Vomiting


   Last Admin: 07/05/18 21:15 Dose:  4 mg











- Labs


Labs: 


 





 07/07/18 15:44 





 07/07/18 15:44 





 











PT  12.0 SECONDS (9.7-12.2)   07/03/18  15:38    


 


INR  1.1   07/03/18  15:38    


 


APTT  32 SECONDS (21-34)   07/03/18  15:38    














- Constitutional


Appears: Well





- Head Exam


Head Exam: ATRAUMATIC, NORMAL INSPECTION, NORMOCEPHALIC





- Eye Exam


Eye Exam: EOMI, Normal appearance, PERRL


Pupil Exam: NORMAL ACCOMODATION, PERRL





- ENT Exam


ENT Exam: Mucous Membranes Moist, Normal Exam





- Neck Exam


Neck Exam: Full ROM, Normal Inspection.  absent: Lymphadenopathy





- Respiratory Exam


Respiratory Exam: Decreased Breath Sounds





- Cardiovascular Exam


Cardiovascular Exam: REGULAR RHYTHM, +S1, +S2





- GI/Abdominal Exam


GI & Abdominal Exam: Soft, Diminished Bowel Sounds





- Rectal Exam


Rectal Exam: Deferred





Assessment and Plan


(1) CHF (congestive heart failure)


Status: Acute   





(2) Dyspnea


Status: Acute   





(3) ESRD needing dialysis


Status: Acute   





(4) Fluid overload


Status: Acute   





(5) Pleural effusion


Status: Acute   





(6) Bleeding from dialysis shunt


Status: Acute   





(7) Diabetes


Status: Acute   





(8) ESRD (end stage renal disease) on dialysis


Status: Acute   





(9) End stage renal disease


Status: Acute   





(10) HTN (hypertension)


Status: Acute   





(11) Nausea & vomiting


Status: Acute   





(12) Pneumonia


Status: Acute   





(13) Pulmonary edema


Status: Acute   





(14) Respiratory distress


Status: Acute   





(15) Respiratory failure


Status: Acute   





- Assessment and Plan (Free Text)


Plan: 





For possible discharge monitor her blood pressure blood pressure is 160/75 the 

last sitting 153/85


Discussed with the family


IV Zithromax


Zofran


We will continue Lasix on discharge at 80 mg daily


As ordered

## 2018-07-08 NOTE — PN
DATE:  07/08/2018



SUBJECTIVE:  The patient's shortness of breath _____ improved.



PHYSICAL EXAMINATION:

VITAL SIGNS:  Blood pressure 153/85, heart rate 61, temperature 98.1,

respirations 20.

HEENT:  Pale conjunctivae.

CHEST:  Diminished breath sounds over the right base.

HEART:  S1 and S2 regular.

ABDOMEN:  Soft.

EXTREMITIES:  No edema.



ASSESSMENT:

1.  Status post volume overload.

2.  Consider right lower lobe pneumonia.

3.  Anemia.

4.  End-stage renal disease, on hemodialysis.

5.  Systemic hypertension.

6.  Severe pulmonary hypertension, most likely secondary to underlying

chronic obstructive lung disease.



RECOMMENDATIONS:  The case was discussed with the patient's son at the

bedside.  Continue current IV Zithromax.  Continue Cozaar 50 mg once a day,

subcutaneous heparin 5000 units every 12 hours, Imdur at 120 mg once a day,

Lasix at 80 mg once a day, Norvasc 10 mg once a day.  The patient will be

followed by her original cardiologist, Dr. Bogdan Pace upon discharge.





__________________________________________

Lex Barraza MD



DD:  07/08/2018 14:47:17

DT:  07/08/2018 17:37:49

Job # [id number]

## 2018-07-09 VITALS — TEMPERATURE: 98.1 F | RESPIRATION RATE: 20 BRPM | HEART RATE: 66 BPM

## 2018-07-09 VITALS — OXYGEN SATURATION: 100 %

## 2018-07-09 VITALS — SYSTOLIC BLOOD PRESSURE: 177 MMHG | DIASTOLIC BLOOD PRESSURE: 85 MMHG

## 2018-07-09 LAB
ALBUMIN SERPL-MCNC: 4 G/DL (ref 3.5–5)
ALBUMIN/GLOB SERPL: 1.4 {RATIO} (ref 1–2.1)
ALT SERPL-CCNC: 27 U/L (ref 9–52)
AST SERPL-CCNC: 21 U/L (ref 14–36)
BASOPHILS # BLD AUTO: 0 K/UL (ref 0–0.2)
BASOPHILS NFR BLD: 0.4 % (ref 0–2)
BUN SERPL-MCNC: 24 MG/DL (ref 7–17)
CALCIUM SERPL-MCNC: 8.7 MG/DL (ref 8.6–10.4)
EOSINOPHIL # BLD AUTO: 0.1 K/UL (ref 0–0.7)
EOSINOPHIL NFR BLD: 3 % (ref 0–4)
ERYTHROCYTE [DISTWIDTH] IN BLOOD BY AUTOMATED COUNT: 18.9 % (ref 11.5–14.5)
GFR NON-AFRICAN AMERICAN: 7
HGB BLD-MCNC: 8.6 G/DL (ref 11–16)
LYMPHOCYTES # BLD AUTO: 0.9 K/UL (ref 1–4.3)
LYMPHOCYTES NFR BLD AUTO: 23.9 % (ref 20–40)
MCH RBC QN AUTO: 27.8 PG (ref 27–31)
MCHC RBC AUTO-ENTMCNC: 32.1 G/DL (ref 33–37)
MCV RBC AUTO: 86.7 FL (ref 81–99)
MONOCYTES # BLD: 0.2 K/UL (ref 0–0.8)
MONOCYTES NFR BLD: 6.3 % (ref 0–10)
NEUTROPHILS # BLD: 2.6 K/UL (ref 1.8–7)
NEUTROPHILS NFR BLD AUTO: 66.4 % (ref 50–75)
NRBC BLD AUTO-RTO: 0 % (ref 0–2)
PLATELET # BLD: 112 K/UL (ref 130–400)
PMV BLD AUTO: 9.5 FL (ref 7.2–11.7)
RBC # BLD AUTO: 3.1 MIL/UL (ref 3.8–5.2)
WBC # BLD AUTO: 3.9 K/UL (ref 4.8–10.8)

## 2018-07-09 NOTE — PN
DATE:  07/09/2018



SUBJECTIVE:  The patient is mildly short of breath, productive cough has

improved.  No retrosternal chest pain.



PHYSICAL EXAMINATION:

VITAL SIGNS:  Blood pressure 177/85, heart rate 66, temperature 98.1,

respirations 20.

HEENT:  Pale conjunctivae.

CHEST:  Diminished breath sounds over right base.

HEART:  S1 and S2 regular.

EXTREMITIES:  No edema.



LABORATORY DATA:  Today's hemoglobin and hematocrit 8.6 and 26.9, white

count 3.9, platelet count 112,000.  SMA-7:  Sodium 140, potassium 4.9,

chloride 97, CO2 is 32, glucose 121 _____, creatinine 6.3.



ASSESSMENT:

1.  Right lower lobe pneumonia.

2.  Chronic obstructive lung disease.

3.  Status post volume overload.

4.  Diastolic left ventricular dysfunction.

5.  End-stage renal disease on hemodialysis.

6.  Anemia.

7.  Mild thrombocytopenia.



RECOMMENDATIONS:  Continue Cozaar 50 mg once a day, Imdur 120 mg once a

day, Lasix at 80 mg once a day, Norvasc 10 mg once a day.  Discontinue

subcutaneous heparin and discontinue telemetry.  The plan is to discharge

the patient today and have her scheduled tomorrow dialysis as an

outpatient.







__________________________________________

Lex Barraza MD



DD:  07/09/2018 13:14:28

DT:  07/09/2018 13:16:12

Job # [id number]

## 2018-07-09 NOTE — CP.PCM.PN
Subjective





- Date & Time of Evaluation


Date of Evaluation: 07/09/18


Time of Evaluation: 12:23





Objective





- Vital Signs/Intake and Output


Vital Signs (last 24 hours): 


 











Temp Pulse Resp BP Pulse Ox


 


 97.7 F   91 H  18   177/85 H  100 


 


 07/09/18 08:00  07/09/18 08:00  07/09/18 08:00  07/09/18 09:55  07/09/18 08:00








Intake and Output: 


 











 07/09/18 07/09/18





 06:59 18:59


 


Intake Total 50 


 


Balance 50 














- Medications


Medications: 


 Current Medications





Acetaminophen (Tylenol 325mg Tab)  650 mg PO Q6 PRN


   PRN Reason: Pain, Mild (1-3)


   Last Admin: 07/05/18 21:15 Dose:  650 mg


Amlodipine Besylate (Norvasc)  10 mg PO DAILY [facility]


   Last Admin: 07/09/18 09:56 Dose:  10 mg


Cinacalcet (Sensipar)  60 mg PO ACD [facility]


   Last Admin: 07/08/18 16:39 Dose:  60 mg


Famotidine (Pepcid)  20 mg PO DAILY [facility]


   Last Admin: 07/09/18 09:56 Dose:  20 mg


Furosemide (Lasix)  80 mg PO DAILY [facility]


   Last Admin: 07/09/18 09:55 Dose:  80 mg


Heparin Sodium (Porcine) (Heparin)  5,000 units SC Q12 [facility]


   Last Admin: 07/09/18 09:55 Dose:  5,000 units


Isosorbide Mononitrate (Imdur)  120 mg PO DAILY [facility]


   Last Admin: 07/09/18 09:56 Dose:  120 mg


Losartan Potassium (Cozaar)  50 mg PO DAILY [facility]


   Last Admin: 07/09/18 09:56 Dose:  50 mg


Ondansetron HCl (Zofran Inj)  4 mg IVP Q6 PRN


   PRN Reason: Nausea/Vomiting


   Last Admin: 07/05/18 21:15 Dose:  4 mg











- Labs


Labs: 


 





 07/09/18 11:06 





 07/09/18 11:06 





 











PT  12.0 SECONDS (9.7-12.2)   07/03/18  15:38    


 


INR  1.1   07/03/18  15:38    


 


APTT  32 SECONDS (21-34)   07/03/18  15:38    














Assessment and Plan





- Assessment and Plan (Free Text)


Assessment: 





FOLLOW UP WITH DR ROMEL CHAVEZ IN 1-2 WEEK IN HIS OFFICE ---CALL FOR APPOINTMENT


FOLLOW UP WITH YOUR OWN CARDIOLOGIST DR MCCLELLAN IN 1 WEEK AT HIS OFFICE ---CALL 

FOR APPOINTMENT


   ADDRESS YOUR BLOOD PRESSURE MED THE CLONIDINE AND LOSARTAN


CONTINUE ALL YOUR HOME MEDICATION '


NEW PRESCRIPTION GIVEN


   LOSARTAN 50 DAILY PO


   STOP THE CLONIDINE UNTIL REVISE BY YOUR CARDIOLOGIST


HEMODIALYSIS AS SCHEDULE


ACTIVITY AS TOLERATED


HOME CARE WITH SERVICE AND PHYSICAL THERAPY AS ORDER;  DX WEAKNESS/ 

DECONDITIONING 


CALL DR ROMEL CHAVEZ OR GO TO THE EMERGENCY ROOM IF SYMPTOMS RETURN OR WORSENING

## 2018-07-10 NOTE — CARD
--------------- APPROVED REPORT --------------





Date of service: 07/05/2018



EKG Measurement

Heart [id number]

SD 200P32

[id number]

[id number]

[id number]



<Conclusion>

Sinus bradycardia

Otherwise normal ECG

## 2018-10-01 ENCOUNTER — HOSPITAL ENCOUNTER (INPATIENT)
Dept: HOSPITAL 31 - C.ER | Age: 68
LOS: 4 days | Discharge: HOME | DRG: 291 | End: 2018-10-05
Attending: INTERNAL MEDICINE | Admitting: INTERNAL MEDICINE
Payer: MEDICARE

## 2018-10-01 VITALS — BODY MASS INDEX: 28.6 KG/M2

## 2018-10-01 DIAGNOSIS — J96.01: ICD-10-CM

## 2018-10-01 DIAGNOSIS — I27.20: ICD-10-CM

## 2018-10-01 DIAGNOSIS — Z88.0: ICD-10-CM
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
## 2018-10-01 DIAGNOSIS — I50.31: ICD-10-CM

## 2018-10-01 DIAGNOSIS — E83.39: ICD-10-CM

## 2018-10-01 DIAGNOSIS — Z99.2: ICD-10-CM

## 2018-10-01 DIAGNOSIS — D64.9: ICD-10-CM

## 2018-10-01 DIAGNOSIS — T86.12: ICD-10-CM

## 2018-10-01 DIAGNOSIS — I13.2: Primary | ICD-10-CM

## 2018-10-01 DIAGNOSIS — N25.81: ICD-10-CM

## 2018-10-01 DIAGNOSIS — E78.00: ICD-10-CM

## 2018-10-01 DIAGNOSIS — N18.6: ICD-10-CM

## 2018-10-01 LAB
ALBUMIN SERPL-MCNC: 4.1 G/DL
ALBUMIN/GLOB SERPL: 1.3 {RATIO}
ALT SERPL-CCNC: 21 U/L
AST SERPL-CCNC: 40 U/L
BACTERIA #/AREA URNS HPF: (no result) /[HPF]
BASOPHILS # BLD AUTO: 0 K/UL
BASOPHILS NFR BLD: 0.5 %
BILIRUB UR-MCNC: NEGATIVE MG/DL
BUN SERPL-MCNC: 31 MG/DL
CALCIUM SERPL-MCNC: 9.2 MG/DL
EOSINOPHIL # BLD AUTO: 0.1 K/UL
EOSINOPHIL NFR BLD: 1.8 %
ERYTHROCYTE [DISTWIDTH] IN BLOOD BY AUTOMATED COUNT: 16.5 %
GFR NON-AFRICAN AMERICAN: 6
GLUCOSE UR STRIP-MCNC: (no result) MG/DL
HGB BLD-MCNC: 10.7 G/DL
LEUKOCYTE ESTERASE UR-ACNC: (no result) LEU/UL
LYMPHOCYTES # BLD AUTO: 1.4 K/UL
LYMPHOCYTES NFR BLD AUTO: 25.6 %
MCH RBC QN AUTO: 26.9 PG
MCHC RBC AUTO-ENTMCNC: 31.8 G/DL
MCV RBC AUTO: 84.6 FL
MONOCYTES # BLD: 0.3 K/UL
MONOCYTES NFR BLD: 5.3 %
NEUTROPHILS # BLD: 3.6 K/UL
NEUTROPHILS NFR BLD AUTO: 66.8 %
NRBC BLD AUTO-RTO: 0.1 %
PH UR STRIP: 9 [PH]
PLATELET # BLD: 154 K/UL
PMV BLD AUTO: 9.9 FL
PROT UR STRIP-MCNC: (no result) MG/DL
RBC # BLD AUTO: 3.97 MIL/UL
RBC # UR STRIP: (no result) /UL
SP GR UR STRIP: 1.01
SQUAMOUS EPITHIAL: 54 /HPF
TROPONIN I SERPL-MCNC: 0.04 NG/ML
UROBILINOGEN UR-MCNC: NORMAL MG/DL
WBC # BLD AUTO: 5.4 K/UL

## 2018-10-01 NOTE — RAD
Date of service: 



10/01/2018



PROCEDURE:  CHEST RADIOGRAPH, 1 VIEW



HISTORY:

SOB



COMPARISON:

08/13/2018.



FINDINGS:



LUNGS:

New right lower lobe infiltrate likely pneumonia.  Pulmonary vascular 

congestion a stable/underlying finding.



PLEURA:

Right pleural effusion inseparable from right lower lobe infiltrate.



CARDIOVASCULAR:

Stable cardiomegaly/pulmonary vascular congestion.



OSSEOUS STRUCTURES:

No significant abnormalities.



VISUALIZED UPPER ABDOMEN:

Normal.



OTHER FINDINGS:

None. 



IMPRESSION:

New right lower lobe infiltrate-right pleural effusion.

## 2018-10-01 NOTE — CP.PCM.HP
Past Patient History





- Infectious Disease


Hx of Infectious Diseases: None





- Past Medical History & Family History


Past Medical History?: Yes





- Past Social History


Smoking Status: Never Smoked





- CARDIAC


Hx Congestive Heart Failure: Yes


Hx Hypercholesterolemia: Yes


Hx Hypertension: Yes





- PULMONARY


Hx Respiratory Disorders: No





- NEUROLOGICAL


Hx Neurological Disorder: No





- HEENT


Hx HEENT Problems: No





- RENAL


Hx Chronic Kidney Disease: Yes





- ENDOCRINE/METABOLIC


Hx Endocrine Disorders: No





- HEMATOLOGICAL/ONCOLOGICAL


Hx Blood Transfusions: Yes





- INTEGUMENTARY


Hx Dermatological Problems: No





- MUSCULOSKELETAL/RHEUMATOLOGICAL


Hx Musculoskeletal Disorders: Yes


Hx Falls: Yes (unstable)





- GASTROINTESTINAL


Hx Gastrointestinal Disorders: No





- GENITOURINARY/GYNECOLOGICAL


Hx Genitourinary Disorders: No


Other/Comment: hd





- PSYCHIATRIC


Hx Substance Use: No





- SURGICAL HISTORY


Hx Surgeries: Yes


Hx Kidney Transplant: Yes


Other/Comment: KIDNEY TRANSPLANT.  LEFT ARM AV SHUNT





- ANESTHESIA


Hx Anesthesia: Yes


Hx Anesthesia Reactions: No


Hx Malignant Hyperthermia: No





Meds


Allergies/Adverse Reactions: 


                                    Allergies











Allergy/AdvReac Type Severity Reaction Status Date / Time


 


Penicillins Allergy Severe RASH Verified 10/01/18 15:22














Results





- Vital Signs


Recent Vital Signs: 





                                Last Vital Signs











Temp  98 F   10/01/18 17:35


 


Pulse  52 L  10/01/18 17:35


 


Resp  18   10/01/18 17:35


 


BP  140/60   10/01/18 17:35


 


Pulse Ox  95   10/01/18 17:56














- Labs


Result Diagrams: 


                                 10/01/18 16:19





                                 10/01/18 16:19


Labs: 





                         Laboratory Results - last 24 hr











  10/01/18 10/01/18 10/01/18





  16:19 16:19 16:19


 


WBC  5.4  


 


RBC  3.97  


 


Hgb  10.7 L D  


 


Hct  33.6 L  


 


MCV  84.6  D  


 


MCH  26.9 L  


 


MCHC  31.8 L  


 


RDW  16.5 H  


 


Plt Count  154  


 


MPV  9.9  


 


Neut % (Auto)  66.8  


 


Lymph % (Auto)  25.6  


 


Mono % (Auto)  5.3  


 


Eos % (Auto)  1.8  


 


Baso % (Auto)  0.5  


 


Neut # (Auto)  3.6  


 


Lymph # (Auto)  1.4  


 


Mono # (Auto)  0.3  


 


Eos # (Auto)  0.1  


 


Baso # (Auto)  0.0  


 


D-Dimer, Quantitative   


 


Sodium    139


 


Potassium    5.4 H


 


Chloride    100


 


Carbon Dioxide    31 H


 


Anion Gap    13


 


BUN    31 H


 


Creatinine    6.4 H


 


Est GFR ( Amer)    8


 


Est GFR (Non-Af Amer)    6


 


Random Glucose    93


 


Calcium    9.2


 


Total Bilirubin    1.2


 


AST    40 H D


 


ALT    21


 


Alkaline Phosphatase    95


 


Troponin I    0.0390


 


NT-Pro-B Natriuret Pep    41249 H


 


Total Protein    7.3


 


Albumin    4.1


 


Globulin    3.2


 


Albumin/Globulin Ratio    1.3


 


Influenza Typ A,B (EIA)   Negative for flu a/b 














  10/01/18





  17:27


 


WBC 


 


RBC 


 


Hgb 


 


Hct 


 


MCV 


 


MCH 


 


MCHC 


 


RDW 


 


Plt Count 


 


MPV 


 


Neut % (Auto) 


 


Lymph % (Auto) 


 


Mono % (Auto) 


 


Eos % (Auto) 


 


Baso % (Auto) 


 


Neut # (Auto) 


 


Lymph # (Auto) 


 


Mono # (Auto) 


 


Eos # (Auto) 


 


Baso # (Auto) 


 


D-Dimer, Quantitative  238


 


Sodium 


 


Potassium 


 


Chloride 


 


Carbon Dioxide 


 


Anion Gap 


 


BUN 


 


Creatinine 


 


Est GFR ( Amer) 


 


Est GFR (Non-Af Amer) 


 


Random Glucose 


 


Calcium 


 


Total Bilirubin 


 


AST 


 


ALT 


 


Alkaline Phosphatase 


 


Troponin I 


 


NT-Pro-B Natriuret Pep 


 


Total Protein 


 


Albumin 


 


Globulin 


 


Albumin/Globulin Ratio 


 


Influenza Typ A,B (EIA)

## 2018-10-01 NOTE — C.PDOC
History Of Present Illness


68-year-old female, PMHx includes Hypertension, CHF, ESRD (HD Tues/Thurs/Sat), 

presents to the emergency department accompanied by son with complaints of 

generalized weakness and loss of appetite. Pts last dialysis was two days ago. 

States she had nausea/vomiting afterward which is typical for her after 

receiving dialysis. Pt seen in Dr Pimentel's office today, where she was found to be

hypoxic, resulting in her being sent to ED for further evaluation. No chest 

pain, diarrhea, fever or any other associated symptoms. No other complaints at 

this time.





PMD   Wil Cano MD>


      Dr Pimentel.


Time Seen by Provider: 10/01/18 15:44


Chief Complaint (Nursing): Shortness Of Breath


History Per: Patient, Family


History/Exam Limitations: no limitations


Onset/Duration Of Symptoms: Days


Current Symptoms Are (Timing): Still Present





Past Medical History


Reviewed: Historical Data, Nursing Documentation, Vital Signs


Vital Signs: 





                                Last Vital Signs











Temp  98.5 F   10/01/18 15:24


 


Pulse  54 L  10/01/18 15:24


 


Resp  24   10/01/18 15:47


 


BP  147/65   10/01/18 15:24


 


Pulse Ox  95   10/01/18 15:47














- Medical History


PMH: CHF, HTN, Hypercholesterolemia, End Stage Renal Disease (on hemodialysis), 

Chronic Kidney Disease





- [facility] Procedures











 (07/03/18)


ASSISTANCE WITH RESPIRATORY VENTILATION, 24-96 HRS, CPAP (07/03/18)


ASSISTANCE WITH RESPIRATORY VENTILATION, <24 HRS, CPAP (03/21/16)


PERFORMANCE OF URINARY FILTRATION, SINGLE (03/21/16)








Family History: States: No Known Family Hx





- Social History


Hx Tobacco Use: No


Hx Alcohol Use: No


Hx Substance Use: No





- Immunization History


Hx Tetanus Toxoid Vaccination: Yes


Hx Influenza Vaccination: Yes


Hx Pneumococcal Vaccination: Yes





Review Of Systems


Constitutional: Positive for: Weakness, Malaise.  Negative for: Fever


Respiratory: Positive for: Shortness of Breath


Gastrointestinal: Negative for: Abdominal Pain


Musculoskeletal: Negative for: Back Pain, Leg Pain


Neurological: Negative for: Weakness, Numbness, Headache, Dizziness





Physical Exam





- Physical Exam


Appears: Non-toxic, No Acute Distress


Skin: Warm, Dry, No Rash


Head: Atraumatic


Eye(s): bilateral: Normal Inspection


Nose: Normal


Oral Mucosa: Moist


Lips: Normal Appearing


Neck: Normal ROM


Chest: Symmetrical


Cardiovascular: Rhythm Regular, No Murmur


Respiratory: Other (bibasilar crackles)


Gastrointestinal/Abdominal: Soft, No Tenderness


Extremity: Normal ROM, Pedal Edema, No Calf Tenderness, No Deformity


Neurological/Psych: Oriented x3, Normal Speech





ED Course And Treatment





- Laboratory Results


Result Diagrams: 


                                 10/01/18 16:19





                                 10/01/18 16:19


O2 Sat by Pulse Oximetry: 95


Pulse Ox Interpretation: Normal (RA)





Medical Decision Making


Medical Decision Making: 





Plan:


* EKG


* Bloodwork


* Chest X-Ray


* Lasix, Avelox, Nitroglycerin


* UA


* Reassess and Disposition


* 


Discussed with Dr. Pimentel, recommended tele. 


Discussed with Dr. Cano , will admit to tele.  





Disposition


Discussed With .: Rosina Cano


Counseled Patient/Family Regarding: Studies Performed, Diagnosis





- Disposition


Disposition: HOSPITALIZED


Disposition Time: 17:54


Condition: GUARDED


Forms:  [facility] (English)





- Clinical Impression


Clinical Impression: 


 Respiratory distress, Chronic congestive heart failure, Acute diastolic 

(congestive) heart failure, Dyspnea, ESRD (end stage renal disease) on dialysis








- Scribe Statement


The provider has reviewed the documentation as recorded by the Scribe (Genna Brown)


Provider Attestation: 








All medical record entries made by the Scribe were at my direction and 

personally dictated by me. I have reviewed the chart and agree that the record 

accurately reflects my personal performance of the history, physical exam, 

medical decision making, and the department course for this patient. I have also

personally directed, reviewed, and agree with the discharge instructions and 

disposition.





Decision To Admit





- Pt Status Changed To:


Hospital Disposition Of: Inpatient





- Admit Certification


Admit to Inpatient:: After my assessment, the patient will require 

hospitalization for at least two midnights.  This is because of the severity of 

symptoms shown, intensity of services needed, and/or the medical risk in this 

patient being treated as an outpatient.





- InPatient:


Physician Admission Certification: I certify that this patient requires 2 or 

more midnights of care for the following reason:: severe SOB, hypoxemia, CHF





- .


Bed Request Type: Telemetry


Admitting Physician: Rosina Cano


Patient Diagnosis: 


 Respiratory distress, Chronic congestive heart failure, Acute diastolic 

(congestive) heart failure, Dyspnea, ESRD (end stage renal disease) on dialysis

## 2018-10-02 LAB
ARTERIAL BLOOD GAS HEMOGLOBIN: 10.2 G/DL
ARTERIAL BLOOD GAS O2 SAT: 98.2 %
ARTERIAL BLOOD GAS PCO2: 50 MM/HG
ARTERIAL BLOOD GAS TCO2: 33.2 MMOL/L
ARTERIAL PATENCY WRIST A: (no result)
HCO3 BLDA-SCNC: 29.6 MMOL/L
INHALED O2 CONCENTRATION: 32 %
PH BLDA: 7.41 [PH]
PO2 BLDA: 66 MM/HG

## 2018-10-02 RX ADMIN — SODIUM CHLORIDE SCH UNITS: 9 INJECTION, SOLUTION INTRAMUSCULAR; INTRAVENOUS; SUBCUTANEOUS at 13:26

## 2018-10-02 NOTE — CP.PCM.PN
Subjective





- Date & Time of Evaluation


Date of Evaluation: 10/02/18


Time of Evaluation: 09:00





- Subjective


Subjective: 


clinically same





Objective





- Vital Signs/Intake and Output


Vital Signs (last 24 hours): 


                                        











Temp Pulse Resp BP Pulse Ox


 


 97.6 F   55 L  24   156/84 H  97 


 


 10/02/18 13:05  10/02/18 13:05  10/02/18 13:05  10/02/18 14:35  10/02/18 13:05








Intake and Output: 


                                        











 10/02/18 10/02/18





 06:59 18:59


 


Intake Total  350


 


Balance  350














- Medications


Medications: 


                               Current Medications





Amlodipine Besylate (Norvasc)  10 mg PO DAILY [facility]


   Last Admin: 10/02/18 12:28 Dose:  10 mg


Carvedilol (Coreg)  6.25 mg PO BID [facility]


   Last Admin: 10/02/18 09:13 Dose:  6.25 mg


Cinacalcet (Sensipar)  60 mg PO ACD [facility]


Famotidine (Pepcid)  20 mg PO DAILY [facility]


   Last Admin: 10/02/18 09:11 Dose:  20 mg


Furosemide (Lasix)  80 mg IVP Q12 [facility]


   Last Admin: 10/02/18 10:00 Dose:  Not Given


Heparin Sodium (Porcine) (Heparin)  5,000 units SC Q8 [facility]


   Last Admin: 10/02/18 14:00 Dose:  Not Given


Heparin Sodium (Porcine) (Heparin)  2,000 units IVP TTS [facility]


   Last Admin: 10/02/18 13:26 Dose:  2,000 units


Isosorbide Mononitrate (Imdur)  120 mg PO DAILY [facility]


   Last Admin: 10/02/18 10:00 Dose:  Not Given


Losartan Potassium (Cozaar)  50 mg PO DAILY [facility]


   Last Admin: 10/02/18 12:28 Dose:  50 mg


Ondansetron HCl (Zofran Tab)  4 mg PO DAILY [facility]


   Last Admin: 10/02/18 12:32 Dose:  4 mg


Vitamin B Complex/Vit C/Folic Acid (Nephro-Frankie)  1 tab PO 0800 [facility]











- Labs


Labs: 


                                        





                                 10/01/18 16:19 





                                 10/01/18 16:19 











- Constitutional


Appears: Well





- Head Exam


Head Exam: ATRAUMATIC, NORMAL INSPECTION, NORMOCEPHALIC





- Eye Exam


Eye Exam: EOMI, Normal appearance, PERRL


Pupil Exam: NORMAL ACCOMODATION, PERRL





- ENT Exam


ENT Exam: Mucous Membranes Moist, Normal Exam





- Neck Exam


Neck Exam: Full ROM, Normal Inspection.  absent: Lymphadenopathy





- Respiratory Exam


Respiratory Exam: Decreased Breath Sounds





- Cardiovascular Exam


Cardiovascular Exam: REGULAR RHYTHM, +S1, +S2





- GI/Abdominal Exam


GI & Abdominal Exam: Soft, Diminished Bowel Sounds





- Rectal Exam


Rectal Exam: Deferred

## 2018-10-02 NOTE — CT
Date of service: 



10/02/2018



PROCEDURE:  CT Chest without contrast



HISTORY:

pneumonia



COMPARISON:

07/06/2018.  CT thorax



TECHNIQUE:

Contiguous axial images were obtained through the chest without 

intravenous contrast enhancement. Sagittal and coronal 

reconstructions were performed.







Radiation dose (DLP): 725.37 mGy-cm. 



This CT exam was performed using one or more of the following dose 

reduction techniques: Automated exposure control, adjustment of the 

mA and/or kV according to patient size, and/or use of iterative 

reconstruction technique.



FINDINGS:



LUNGS:

 Progressive alveolar edema with associated interstitial components



More confluent findings right lower lobe likely represent compressive 

atelectasis. 



MEDIASTINUM:

Unremarkable thoracic aorta. No aneurysm. Cardiomegaly/pulmonary 

vascular congestion.  Dilated main pulmonary artery 4.8 cm consistent 

with pulmonary arterial hypertension no lymphadenopathy.



PLEURA:

New pleural effusions.



BONES:

No fracture. No destructive lesion. 



UPPER ABDOMEN:

Grossly unremarkable.  Markedly atrophic kidneys consistent with 

medical renal disease.  Multiple renal cysts again identified. 



OTHER FINDINGS:

None.



IMPRESSION:

Cardiomegaly/cardiogenic pulmonary edema.  Pleural effusions 

represent a new finding compared to the prior study.



Findings at the lung bases particularly the right appear to be 

related to worsening congestive heart failure/pulmonary edema with 

associated compressive atelectasis from the new right pleural 

effusion

## 2018-10-02 NOTE — CP.PCM.CON
History of Present Illness





- History of Present Illness


History of Present Illness: 








68-year-old female with history of end-stage renal disease, seen in the office 

for shortness of breath and was found to have saturation in the mid 70s and 

advised to go to emergency room for evaluation and possible admission. Patient 

also complaining of cough which is mostly dry. Denies chest pain, denies fever 

or chills.








Review of Systems





- Review of Systems


All systems: reviewed and no additional remarkable complaints except (hortness 

of breath)





Past Patient History





- Infectious Disease


Hx of Infectious Diseases: None





- Past Medical History & Family History


Past Medical History?: Yes





- Past Social History


Smoking Status: Never Smoked





- CARDIAC


Hx Congestive Heart Failure: Yes


Hx Hypercholesterolemia: Yes


Hx Hypertension: Yes





- PULMONARY


Hx Respiratory Disorders: No





- NEUROLOGICAL


Hx Neurological Disorder: No





- HEENT


Hx HEENT Problems: No





- RENAL


Hx Chronic Kidney Disease: Yes


Date of Last Dialysis Treatment: 09/29/18





- ENDOCRINE/METABOLIC


Hx Endocrine Disorders: No





- HEMATOLOGICAL/ONCOLOGICAL


Hx Blood Transfusions: Yes





- INTEGUMENTARY


Hx Dermatological Problems: No





- MUSCULOSKELETAL/RHEUMATOLOGICAL


Hx Musculoskeletal Disorders: Yes


Hx Falls: Yes (unstable)





- GASTROINTESTINAL


Hx Gastrointestinal Disorders: No





- GENITOURINARY/GYNECOLOGICAL


Hx Genitourinary Disorders: No


Other/Comment: hd





- PSYCHIATRIC


Hx Substance Use: No





- SURGICAL HISTORY


Hx Surgeries: Yes


Hx Kidney Transplant: Yes


Other/Comment: KIDNEY TRANSPLANT.  LEFT ARM AV SHUNT





- ANESTHESIA


Hx Anesthesia: Yes


Hx Anesthesia Reactions: No


Hx Malignant Hyperthermia: No





Meds


Allergies/Adverse Reactions: 


                                    Allergies











Allergy/AdvReac Type Severity Reaction Status Date / Time


 


Penicillins Allergy Severe RASH Verified 10/01/18 15:22














- Medications


Medications: 


                               Current Medications





Amlodipine Besylate (Norvasc)  10 mg PO DAILY [facility]


   Last Admin: 10/02/18 12:28 Dose:  10 mg


Carvedilol (Coreg)  6.25 mg PO BID [facility]


   Last Admin: 10/02/18 17:36 Dose:  6.25 mg


Cinacalcet (Sensipar)  60 mg PO ACD [facility]


   Last Admin: 10/02/18 17:29 Dose:  Not Given


Famotidine (Pepcid)  20 mg PO DAILY [facility]


   Last Admin: 10/02/18 09:11 Dose:  20 mg


Furosemide (Lasix)  80 mg IVP Q12 [facility]


   Last Admin: 10/02/18 10:00 Dose:  Not Given


Heparin Sodium (Porcine) (Heparin)  5,000 units SC Q8 [facility]


   Last Admin: 10/02/18 14:00 Dose:  Not Given


Heparin Sodium (Porcine) (Heparin)  2,000 units IVP TTS [facility]


   Last Admin: 10/02/18 13:26 Dose:  2,000 units


Isosorbide Mononitrate (Imdur)  120 mg PO DAILY [facility]


   Last Admin: 10/02/18 10:00 Dose:  Not Given


Losartan Potassium (Cozaar)  50 mg PO DAILY [facility]


   Last Admin: 10/02/18 12:28 Dose:  50 mg


Ondansetron HCl (Zofran Tab)  4 mg PO DAILY [facility]


   Last Admin: 10/02/18 12:32 Dose:  4 mg


Vitamin B Complex/Vit C/Folic Acid (Nephro-Frankie)  1 tab PO 0800 [facility]











Physical Exam





- Head Exam


Head Exam: ATRAUMATIC, NORMOCEPHALIC





- ENT Exam


ENT Exam: Mucous Membranes Moist





- Neck Exam


Neck exam: Positive for: Normal Inspection





- Respiratory Exam


Respiratory Exam: Rales





- Cardiovascular Exam


Cardiovascular Exam: REGULAR RHYTHM





- GI/Abdominal Exam


GI & Abdominal Exam: Normal Bowel Sounds





Results





- Vital Signs


Recent Vital Signs: 


                                Last Vital Signs











Temp  97.7 F   10/02/18 17:28


 


Pulse  60   10/02/18 18:00


 


Resp  20   10/02/18 17:28


 


BP  135/76   10/02/18 17:28


 


Pulse Ox  95   10/02/18 17:28














- Labs


Result Diagrams: 


                                 10/01/18 16:19





                                 10/01/18 16:19


Labs: 


                         Laboratory Results - last 24 hr











  10/01/18





  22:06


 


Urine Color  Yellow


 


Urine Clarity  Hazy


 


Urine pH  9.0


 


Ur Specific Gravity  1.008


 


Urine Protein  2+ H


 


Urine Glucose (UA)  2+ H


 


Urine Ketones  Negative


 


Urine Blood  1+ H


 


Urine Nitrate  Negative


 


Urine Bilirubin  Negative


 


Urine Urobilinogen  Normal


 


Ur Leukocyte Esterase  3+ H


 


Urine WBC (Auto)  117 H


 


Urine RBC (Auto)  17 H


 


Ur Squamous Epith Cells  54 H


 


Urine Bacteria  Occ H














Assessment & Plan


(1) CHF (congestive heart failure)


Status: Acute   


Comment: CAT scan of the chest consistent with pulmonary edema/infiltrate.  

Continue hemodialysis.  Echocardiogram.  Legionella mycoplasma titer   





(2) ESRD (end stage renal disease) on dialysis


Status: Acute   





(3) Fluid overload


Status: Acute

## 2018-10-02 NOTE — CP.PCM.CON
History of Present Illness





- History of Present Illness


History of Present Illness: 


Nephrology Consultation Note (covering for Dr ROMEL Cano): 





Assessment: Stable


Acute hypoxic respi failure


Pulm edema


severe pulmonary HTN


Hypertensive Chronic Kidney Disease (I12.0)


End stage renal disease (N18.6) dependence on hemodialysis (Z99.2) (TTS) via AVF


Anemia (D64.9), Hyperphosphatemia (E83.39), Secondary Hyperparathyroidism (E21.1

), HTN (I12.0)


hx of failed kidney Transplant





Plan:


Will plan for HD today as ordered. Will plan for dialysis tomorrow as isolated 

UF only. Continue with Nephrovite 1 tab/day. 


PRBC as needed for anemia. not on ALBINO with dialysis as last Hb 10.7


Continue with phos binders, last phos level: check


Continue with sensipar 


BP control with meds as ordered. Patient on RAAS blocker as losartan


Glycemic control, Dialysis consistent diet


Further work up/management as per primary team


Dose meds/antibiotics (if needed) for ESRD status. Avoid fleets enema/magnesium 

based laxatives.





Thanks for allowing me to participate in care of your patient. Will follow 

patient with you. Please call if any Qs. had d.w team and family bedside


Dr Wilbert Orta


Office: [phone] Cell: [phone] Fax: [phone]





Chief Complaint; SOB and low Oxygen sat


Reason for consult: ESRD


HPI: Pt is a 68 F with hx of ESRD on hemodialysis (TTS) via AVF , last dialysis 

sat, chronic anemia, hyperphosphatemia, secondary hyperparathyroidism, 

hypertension severe pulmonary HTN presented with complaints of SOB x 1-2 day 

along with low O2 sat in doctor office hence admitted for further management


Renal consult requested for ESRD management. 


pt feels better with O2


SOB better


no cough. makes minimal urine only


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 

transplant for 9 years, now on HD x 6 years





ROS: 


Cardiovascular: No chest pain. 


Pulmonary: c/o shortness of breath 


Gastrointestinal: denies abdominal pain No nausea. No vomiting. 


Genitourinary: No pain while urinating. Denies blood in urine. makes minimal 

urine


All other negative except as mentioned in HPI





Physical Examination: 


General Appearance: Comfortable, in no acute respiratory distress, co-operative 

. 


Vitals reviewed and noted as below


Head; Atraumatic, normocephalic


ENT: no ulcers no thrush. Tongue is midline. Oropharynx: no rash or ulcers.


EYES: Pupils are equal, round and reactive to light accommodation. Eye muscles 

and extraocular movement intact. Sclera is anicteric.


Neck; supple no lymphadenopathy, no thyromegaly or bruit


Lungs: Normal respiratory rate/effort. Breath sounds bilateral decreased at bas

es with crackles


Heart: Normal rate. s1s2 normal. No rub or gallop. 


Extremities: no edema. No varicose veins 


Neurological: Patient is alert, awake and oriented to person, place and time. No

focal deficit. Strength bilateral appropriate and equal 


Skin: Warm and dry. Normal turgor. No rash. Palpitation: Normal elasticity for 

age


Abdomen: Abdomen is soft. Bowel sounds +. There is no abdominal tenderness, no 

guarding/rigidity or organomegaly


Psych: normal insight and normal affect/mood


MSK: no joint tenderness or swelling. Digits and nails normal, no deformity


: kidney or bladder not palpable


Access: AVF with pseudoaneurysm





Labs/imaging reviewed.


Past medical history, past surgical history, family history, social history, 

allergy reviewed and noted as below


Family Hx: no hx of CKD. Non contributory 





echo 2017: severe pHTN with normal LVEF





Past Patient History





- Infectious Disease


Hx of Infectious Diseases: None





- Past Medical History & Family History


Past Medical History?: Yes





- Past Social History


Smoking Status: Never Smoked





- CARDIAC


Hx Congestive Heart Failure: Yes


Hx Hypercholesterolemia: Yes


Hx Hypertension: Yes





- PULMONARY


Hx Respiratory Disorders: No





- NEUROLOGICAL


Hx Neurological Disorder: No





- HEENT


Hx HEENT Problems: No





- RENAL


Hx Chronic Kidney Disease: Yes


Date of Last Dialysis Treatment: 09/29/18





- ENDOCRINE/METABOLIC


Hx Endocrine Disorders: No





- HEMATOLOGICAL/ONCOLOGICAL


Hx Blood Transfusions: Yes





- INTEGUMENTARY


Hx Dermatological Problems: No





- MUSCULOSKELETAL/RHEUMATOLOGICAL


Hx Musculoskeletal Disorders: Yes


Hx Falls: Yes (unstable)





- GASTROINTESTINAL


Hx Gastrointestinal Disorders: No





- GENITOURINARY/GYNECOLOGICAL


Hx Genitourinary Disorders: No


Other/Comment: hd





- PSYCHIATRIC


Hx Substance Use: No





- SURGICAL HISTORY


Hx Surgeries: Yes


Hx Kidney Transplant: Yes


Other/Comment: KIDNEY TRANSPLANT.  LEFT ARM AV SHUNT





- ANESTHESIA


Hx Anesthesia: Yes


Hx Anesthesia Reactions: No


Hx Malignant Hyperthermia: No





Meds


Allergies/Adverse Reactions: 


                                    Allergies











Allergy/AdvReac Type Severity Reaction Status Date / Time


 


Penicillins Allergy Severe RASH Verified 10/01/18 15:22














- Medications


Medications: 


                               Current Medications





Amlodipine Besylate (Norvasc)  10 mg PO DAILY [facility]


Carvedilol (Coreg)  6.25 mg PO BID [facility]


   Last Admin: 10/02/18 09:13 Dose:  6.25 mg


Cinacalcet (Sensipar)  60 mg PO ACD [facility]


Famotidine (Pepcid)  20 mg PO DAILY [facility]


   Last Admin: 10/02/18 09:11 Dose:  20 mg


Furosemide (Lasix)  80 mg IVP Q12 [facility]


Heparin Sodium (Porcine) (Heparin)  5,000 units SC Q8 [facility]


   Last Admin: 10/02/18 05:55 Dose:  5,000 units


Heparin Sodium (Porcine) (Heparin)  2,000 units IVP TTS [facility]


Isosorbide Mononitrate (Imdur)  120 mg PO DAILY [facility]


Losartan Potassium (Cozaar)  50 mg PO DAILY [facility]


Ondansetron HCl (Zofran Tab)  4 mg PO DAILY [facility]


Vitamin B Complex/Vit C/Folic Acid (Nephro-Frankie)  1 tab PO 0800 [facility]











Results





- Vital Signs


Recent Vital Signs: 


                                Last Vital Signs











Temp  97.6 F   10/02/18 07:20


 


Pulse  52 L  10/02/18 08:15


 


Resp  20   10/02/18 07:20


 


BP  162/77 H  10/02/18 07:20


 


Pulse Ox  94 L  10/02/18 07:20














- Labs


Result Diagrams: 


                                 10/01/18 16:19





                                 10/01/18 16:19


Labs: 


                         Laboratory Results - last 24 hr











  10/01/18 10/01/18 10/01/18





  16:19 16:19 16:19


 


WBC  5.4  


 


RBC  3.97  


 


Hgb  10.7 L D  


 


Hct  33.6 L  


 


MCV  84.6  D  


 


MCH  26.9 L  


 


MCHC  31.8 L  


 


RDW  16.5 H  


 


Plt Count  154  


 


MPV  9.9  


 


Neut % (Auto)  66.8  


 


Lymph % (Auto)  25.6  


 


Mono % (Auto)  5.3  


 


Eos % (Auto)  1.8  


 


Baso % (Auto)  0.5  


 


Neut # (Auto)  3.6  


 


Lymph # (Auto)  1.4  


 


Mono # (Auto)  0.3  


 


Eos # (Auto)  0.1  


 


Baso # (Auto)  0.0  


 


D-Dimer, Quantitative   


 


Sodium    139


 


Potassium    5.4 H


 


Chloride    100


 


Carbon Dioxide    31 H


 


Anion Gap    13


 


BUN    31 H


 


Creatinine    6.4 H


 


Est GFR ( Amer)    8


 


Est GFR (Non-Af Amer)    6


 


Random Glucose    93


 


Calcium    9.2


 


Total Bilirubin    1.2


 


AST    40 H D


 


ALT    21


 


Alkaline Phosphatase    95


 


Troponin I    0.0390


 


NT-Pro-B Natriuret Pep    60358 H


 


Total Protein    7.3


 


Albumin    4.1


 


Globulin    3.2


 


Albumin/Globulin Ratio    1.3


 


Urine Color   


 


Urine Clarity   


 


Urine pH   


 


Ur Specific Gravity   


 


Urine Protein   


 


Urine Glucose (UA)   


 


Urine Ketones   


 


Urine Blood   


 


Urine Nitrate   


 


Urine Bilirubin   


 


Urine Urobilinogen   


 


Ur Leukocyte Esterase   


 


Urine WBC (Auto)   


 


Urine RBC (Auto)   


 


Ur Squamous Epith Cells   


 


Urine Bacteria   


 


Influenza Typ A,B (EIA)   Negative for flu a/b 














  10/01/18 10/01/18





  17:27 22:06


 


WBC  


 


RBC  


 


Hgb  


 


Hct  


 


MCV  


 


MCH  


 


MCHC  


 


RDW  


 


Plt Count  


 


MPV  


 


Neut % (Auto)  


 


Lymph % (Auto)  


 


Mono % (Auto)  


 


Eos % (Auto)  


 


Baso % (Auto)  


 


Neut # (Auto)  


 


Lymph # (Auto)  


 


Mono # (Auto)  


 


Eos # (Auto)  


 


Baso # (Auto)  


 


D-Dimer, Quantitative  238 


 


Sodium  


 


Potassium  


 


Chloride  


 


Carbon Dioxide  


 


Anion Gap  


 


BUN  


 


Creatinine  


 


Est GFR ( Amer)  


 


Est GFR (Non-Af Amer)  


 


Random Glucose  


 


Calcium  


 


Total Bilirubin  


 


AST  


 


ALT  


 


Alkaline Phosphatase  


 


Troponin I  


 


NT-Pro-B Natriuret Pep  


 


Total Protein  


 


Albumin  


 


Globulin  


 


Albumin/Globulin Ratio  


 


Urine Color   Yellow


 


Urine Clarity   Hazy


 


Urine pH   9.0


 


Ur Specific Gravity   1.008


 


Urine Protein   2+ H


 


Urine Glucose (UA)   2+ H


 


Urine Ketones   Negative


 


Urine Blood   1+ H


 


Urine Nitrate   Negative


 


Urine Bilirubin   Negative


 


Urine Urobilinogen   Normal


 


Ur Leukocyte Esterase   3+ H


 


Urine WBC (Auto)   117 H


 


Urine RBC (Auto)   17 H


 


Ur Squamous Epith Cells   54 H


 


Urine Bacteria   Occ H


 


Influenza Typ A,B (EIA)

## 2018-10-03 LAB
ALBUMIN SERPL-MCNC: 3.8 G/DL
ALBUMIN/GLOB SERPL: 1.3 {RATIO}
ALT SERPL-CCNC: 36 U/L
AST SERPL-CCNC: 22 U/L
BASOPHILS # BLD AUTO: 0 K/UL
BASOPHILS NFR BLD: 0.7 %
BUN SERPL-MCNC: 16 MG/DL
CALCIUM SERPL-MCNC: 8.9 MG/DL
EOSINOPHIL # BLD AUTO: 0.1 K/UL
EOSINOPHIL NFR BLD: 3 %
ERYTHROCYTE [DISTWIDTH] IN BLOOD BY AUTOMATED COUNT: 16.6 %
GFR NON-AFRICAN AMERICAN: 10
HGB BLD-MCNC: 10.5 G/DL
LYMPHOCYTES # BLD AUTO: 1.1 K/UL
LYMPHOCYTES NFR BLD AUTO: 21.6 %
MCH RBC QN AUTO: 27.5 PG
MCHC RBC AUTO-ENTMCNC: 32.6 G/DL
MCV RBC AUTO: 84.4 FL
MONOCYTES # BLD: 0.2 K/UL
MONOCYTES NFR BLD: 4.6 %
NEUTROPHILS # BLD: 3.4 K/UL
NEUTROPHILS NFR BLD AUTO: 70.1 %
NRBC BLD AUTO-RTO: 0.1 %
PLATELET # BLD: 136 K/UL
PMV BLD AUTO: 9.5 FL
RBC # BLD AUTO: 3.82 MIL/UL
WBC # BLD AUTO: 4.9 K/UL

## 2018-10-03 RX ADMIN — Medication SCH TAB: at 08:00

## 2018-10-03 NOTE — CARD
--------------- APPROVED REPORT --------------





Date of service: 10/03/2018



EXAM: Two-dimensional and M-mode echocardiogram with Doppler and 

color Doppler.



Other Information 

Quality : GoodRhythm : 



INDICATION

Pleural Effusion Congestive Heart Failure COPD 



RISK FACTORS

Hypertension 

Hyperlipidemia

Diabetes



2D DIMENSIONS 

IVSd1.4   (0.7-1.1cm)Aortic Root (2D)2.8   (2.0-3.7cm)

LVDd4.7   (3.9-5.9cm)PWd1.3   (0.7-1.1cm)

LA Nychij93   (18-58mL)LVDs3.0   (2.5-4.0cm)

FS (%) 36.9   %LVEF (%)66.8   (>50%)

LVEF ([facility])55 %IVC0.00 cm



M-Mode DIMENSIONS 

Left Atrium (MM)5.08   (2.5-4.0cm)IVSd1.25   (0.7-1.1cm)

Aortic Root2.34   (2.2-3.7cm)LVDd5.27   (4.0-5.6cm)

Aortic Cusp Exc.1.37   (1.5-2.0cm)PWd1.21   (0.7-1.1cm)

FS (%) 46   %LVDs2.85   (2.0-3.8cm)

LVEF (%)65   (>50%)



Aortic Valve

AoV Peak Avwmsbda975.5cm/sAoV VTI39.8cmAO Peak GR.15mmHg

AO Mean GR.6mmHg



Mitral Valve

MV E Jpiixgsq400.2cm/sMV E Peak Gr.120mmHgMV A Prjhvbek912.5cm/s

MV E Mean Gr.77mmHgE/A ratio1.4



TDI

Lateral E' Peak V6.03cm/sMedial E' Peak V4.80cm/sE/Lateral E'25.2

E/Medial E'31.7



Tricuspid Valve

TR Peak Eohdssfz519ln/sTR Peak Gr.47hkApJRJF87fdXq



 LEFT VENTRICLE 

The left ventricle is normal size.

There is moderate concentric left ventricular hypertrophy.

The left ventricular function is normal.

The left ventricular ejection fraction is within the normal range, 

65% visually

No regional wall motion abnormalities noted.

Transmitral Doppler flow pattern is Grade II-pseudonormal filling 

dynamics. A L wave is seen. 

Tissue doppler is c/w elevated left atrial pressure. 

No left ventricle thrombus noted on this study.

There is no ventricular septal defect visualized.

There is no left ventricular aneurysm. 

There is no mass noted in the left ventricle.



 RIGHT VENTRICLE 

The right ventricle is normal size.

There is normal right ventricular wall thickness.

The right ventricular systolic function is normal.



 ATRIA 

The left atial volume index is moderately increased. 

The right atrium size is normal.

The interatrial septum is intact with no evidence for an atrial 

septal defect.



 AORTIC VALVE 

The aortic valve is normal in structure and function.

No aortic regurgitation is present. 

There is no aortic valvular stenosis. 

There is no aortic valvular vegetation.



 MITRAL VALVE 

The mitral valve opens well. There is moderate annnularr 

calcification.

There is no evidence of mitral valve prolapse.

There is no mitral valve stenosis. 

There is mild mitral valve regurgitation noted.



 TRICUSPID VALVE 

The tricuspid valve is normal in structure and function.

There is mild tricuspid valve regurgitation noted. Estimated PA 

systolic pressure is 55 mm Hg. 

There is no tricuspid valve prolapse or vegetation.

There is no tricuspid valve stenosis. 



 PULMONIC VALVE 

The pulmonary valve is normal in structure and function.

There is no pulmonic valvular regurgitation. 

There is no pulmonic valvular stenosis.



 GREAT VESSELS 

The aortic root is normal in size.

The ascending aorta is normal in size.

The pulmonary artery is normal.

The IVC is normal in size and collapses >50% with inspiration.



 PERICARDIAL EFFUSION 

The pericardium appears normal.

There is no pleural effusion.



<Conclusion>

The left ventricular function is normal.

There is moderate concentric left ventricular hypertrophy.

Transmitral Doppler flow pattern is Grade II-pseudonormal filling 

dynamics. An L wave is seen. 

Tissue doppler is c/w elevated left atrial pressure.

The left atial volume index is moderately increased.

There is mild mitral valve regurgitation noted.

Increased left atrial pressure.

Moderate to severe pulmonary HTN.

## 2018-10-03 NOTE — CARD
--------------- APPROVED REPORT --------------





Date of service: 10/01/2018



EKG Measurement

Heart [id number]

WI 184P-4

[id number]

[id number]

[id number]



<Conclusion>

Sinus bradycardia

Otherwise normal ECG

## 2018-10-03 NOTE — CP.PCM.PN
Subjective





- Date & Time of Evaluation


Date of Evaluation: 10/03/18


Time of Evaluation: 16:00





- Subjective


Subjective: 





patient seen and examined


Breathing better


Status post hemodialysis again today


Afebrile


Slight cough


CAT scan of the chest consistent with pulmonary edema








Objective





- Vital Signs/Intake and Output


Vital Signs (last 24 hours): 


                                        











Temp Pulse Resp BP Pulse Ox


 


 98.0 F   66   20   160/77 H  98 


 


 10/03/18 15:25  10/03/18 15:25  10/03/18 15:25  10/03/18 15:25  10/03/18 15:25








Intake and Output: 


                                        











 10/03/18 10/03/18





 06:59 18:59


 


Intake Total  360


 


Balance  360














- Medications


Medications: 


                               Current Medications





Amlodipine Besylate (Norvasc)  10 mg PO DAILY [facility]


   Last Admin: 10/03/18 14:12 Dose:  10 mg


Carvedilol (Coreg)  6.25 mg PO BID [facility]


   Last Admin: 10/03/18 17:10 Dose:  6.25 mg


Cinacalcet (Sensipar)  60 mg PO ACD [facility]


   Last Admin: 10/03/18 17:09 Dose:  60 mg


Famotidine (Pepcid)  20 mg PO DAILY [facility]


   Last Admin: 10/03/18 09:35 Dose:  20 mg


Heparin Sodium (Porcine) (Heparin)  5,000 units SC Q8 [facility]


   Last Admin: 10/03/18 14:20 Dose:  5,000 units


Heparin Sodium (Porcine) (Heparin)  2,000 units IVP TTS [facility]


   Last Admin: 10/02/18 13:26 Dose:  2,000 units


Isosorbide Mononitrate (Imdur)  120 mg PO DAILY [facility]


   Last Admin: 10/03/18 14:13 Dose:  120 mg


Losartan Potassium (Cozaar)  50 mg PO DAILY [facility]


   Last Admin: 10/03/18 14:12 Dose:  50 mg


Ondansetron HCl (Zofran Tab)  4 mg PO DAILY [facility]


   Last Admin: 10/03/18 14:17 Dose:  4 mg


Vitamin B Complex/Vit C/Folic Acid (Nephro-Frankie)  1 tab PO 0800 [facility]


   Last Admin: 10/03/18 08:00 Dose:  1 tab











- Labs


Labs: 


                                        





                                 10/03/18 10:16 





                                 10/03/18 10:16 











- Head Exam


Head Exam: ATRAUMATIC, NORMOCEPHALIC





- ENT Exam


ENT Exam: Mucous Membranes Moist





- Neck Exam


Neck Exam: Normal Inspection





- Respiratory Exam


Respiratory Exam: Rales





- Cardiovascular Exam


Cardiovascular Exam: REGULAR RHYTHM





Assessment and Plan


(1) CHF (congestive heart failure)


Assessment & Plan: 


Patient advised to decrease fluid intake


continue hemodialysis


ABG room air


Continue present treatment for now


Status: Acute   





(2) ESRD (end stage renal disease) on dialysis


Status: Acute   





(3) Fluid overload


Status: Acute

## 2018-10-03 NOTE — CP.PCM.PN
Subjective





- Date & Time of Evaluation


Date of Evaluation: 10/03/18


Time of Evaluation: 09:00





- Subjective


Subjective: 


clinically same





Objective





- Vital Signs/Intake and Output


Vital Signs (last 24 hours): 


                                        











Temp Pulse Resp BP Pulse Ox


 


 98.0 F   63   20   160/77 H  98 


 


 10/03/18 15:25  10/03/18 18:00  10/03/18 15:25  10/03/18 15:25  10/03/18 15:25








Intake and Output: 


                                        











 10/03/18 10/04/18





 18:59 06:59


 


Intake Total 360 


 


Balance 360 














- Medications


Medications: 


                               Current Medications





Amlodipine Besylate (Norvasc)  10 mg PO DAILY [facility]


   Last Admin: 10/03/18 14:12 Dose:  10 mg


Carvedilol (Coreg)  6.25 mg PO BID [facility]


   Last Admin: 10/03/18 17:10 Dose:  6.25 mg


Cinacalcet (Sensipar)  60 mg PO ACD [facility]


   Last Admin: 10/03/18 17:09 Dose:  60 mg


Famotidine (Pepcid)  20 mg PO DAILY [facility]


   Last Admin: 10/03/18 09:35 Dose:  20 mg


Heparin Sodium (Porcine) (Heparin)  5,000 units SC Q8 [facility]


   Last Admin: 10/03/18 14:20 Dose:  5,000 units


Heparin Sodium (Porcine) (Heparin)  2,000 units IVP TTS [facility]


   Last Admin: 10/02/18 13:26 Dose:  2,000 units


Isosorbide Mononitrate (Imdur)  120 mg PO DAILY [facility]


   Last Admin: 10/03/18 14:13 Dose:  120 mg


Losartan Potassium (Cozaar)  50 mg PO DAILY [facility]


   Last Admin: 10/03/18 14:12 Dose:  50 mg


Ondansetron HCl (Zofran Tab)  4 mg PO DAILY [facility]


   Last Admin: 10/03/18 14:17 Dose:  4 mg


Vitamin B Complex/Vit C/Folic Acid (Nephro-Frankie)  1 tab PO 0800 [facility]


   Last Admin: 10/03/18 08:00 Dose:  1 tab











- Labs


Labs: 


                                        





                                 10/03/18 10:16 





                                 10/03/18 10:16

## 2018-10-04 VITALS — RESPIRATION RATE: 20 BRPM

## 2018-10-04 PROCEDURE — 5A1D70Z PERFORMANCE OF URINARY FILTRATION, INTERMITTENT, LESS THAN 6 HOURS PER DAY: ICD-10-PCS | Performed by: INTERNAL MEDICINE

## 2018-10-04 RX ADMIN — Medication SCH TAB: at 08:32

## 2018-10-04 RX ADMIN — SODIUM CHLORIDE SCH: 9 INJECTION, SOLUTION INTRAMUSCULAR; INTRAVENOUS; SUBCUTANEOUS at 10:00

## 2018-10-04 NOTE — CP.PCM.PN
Subjective





- Date & Time of Evaluation


Date of Evaluation: 10/04/18


Time of Evaluation: 08:45





- Subjective


Subjective: 


clinically same





Objective





- Vital Signs/Intake and Output


Vital Signs (last 24 hours): 


                                        











Temp Pulse Resp BP Pulse Ox


 


 98.1 F   72   20   129/74   95 


 


 10/04/18 16:00  10/04/18 18:00  10/04/18 16:00  10/04/18 16:00  10/04/18 16:00








Intake and Output: 


                                        











 10/04/18 10/05/18





 18:59 06:59


 


Intake Total 380 


 


Balance 380 














- Medications


Medications: 


                               Current Medications





Amlodipine Besylate (Norvasc)  10 mg PO DAILY [facility]


   Last Admin: 10/04/18 10:00 Dose:  Not Given


Carvedilol (Coreg)  6.25 mg PO BID [facility]


   Last Admin: 10/04/18 17:49 Dose:  6.25 mg


Cinacalcet (Sensipar)  60 mg PO ACD [facility]


   Last Admin: 10/04/18 17:30 Dose:  60 mg


Famotidine (Pepcid)  20 mg PO DAILY [facility]


   Last Admin: 10/04/18 14:50 Dose:  20 mg


Heparin Sodium (Porcine) (Heparin)  5,000 units SC Q8 [facility]


   Last Admin: 10/04/18 14:50 Dose:  5,000 units


Heparin Sodium (Porcine) (Heparin)  2,000 units IVP TTS [facility]


   Last Admin: 10/04/18 10:00 Dose:  Not Given


Isosorbide Mononitrate (Imdur)  120 mg PO DAILY [facility]


   Last Admin: 10/04/18 14:54 Dose:  120 mg


Losartan Potassium (Cozaar)  50 mg PO DAILY [facility]


   Last Admin: 10/04/18 14:50 Dose:  50 mg


Ondansetron HCl (Zofran Tab)  4 mg PO DAILY [facility]


   Last Admin: 10/04/18 14:50 Dose:  4 mg


Vitamin B Complex/Vit C/Folic Acid (Nephro-Frankie)  1 tab PO 0800 [facility]


   Last Admin: 10/04/18 08:32 Dose:  1 tab











- Labs


Labs: 


                                        





                                 10/03/18 10:16 





                                 10/03/18 10:16 











- Constitutional


Appears: Well





- Head Exam


Head Exam: ATRAUMATIC, NORMAL INSPECTION, NORMOCEPHALIC





- Eye Exam


Eye Exam: EOMI, Normal appearance, PERRL


Pupil Exam: NORMAL ACCOMODATION, PERRL





- ENT Exam


ENT Exam: Mucous Membranes Moist, Normal Exam





- Neck Exam


Neck Exam: Full ROM, Normal Inspection.  absent: Lymphadenopathy





- Respiratory Exam


Respiratory Exam: Decreased Breath Sounds





- Cardiovascular Exam


Cardiovascular Exam: REGULAR RHYTHM, +S1, +S2





- GI/Abdominal Exam


GI & Abdominal Exam: Soft, Diminished Bowel Sounds





- Rectal Exam


Rectal Exam: Deferred

## 2018-10-05 VITALS
TEMPERATURE: 97.5 F | DIASTOLIC BLOOD PRESSURE: 76 MMHG | OXYGEN SATURATION: 95 % | HEART RATE: 59 BPM | SYSTOLIC BLOOD PRESSURE: 145 MMHG

## 2018-10-05 LAB
ARTERIAL BLOOD GAS HEMOGLOBIN: 11.2 G/DL
ARTERIAL BLOOD GAS O2 SAT: 91.5 %
ARTERIAL BLOOD GAS PCO2: 45 MM/HG
ARTERIAL BLOOD GAS TCO2: 32.7 MMOL/L
ARTERIAL PATENCY WRIST A: (no result)
HCO3 BLDA-SCNC: 29.8 MMOL/L
INHALED O2 CONCENTRATION: 21 %
PH BLDA: 7.45 [PH]
PO2 BLDA: 50 MM/HG

## 2018-10-05 RX ADMIN — Medication SCH TAB: at 07:49

## 2018-10-05 NOTE — CP.PCM.PN
Restorative Specialist Mobility Note       Activity: Bedrest, Dangle, Stand at bedside, Turn, Ambulate in room, Chair     Assistive Device: Front wheel walker     Ambulation Response: Tolerated fairly well  Repositioned: Sitting, Up in chair              Anti-Embolism Device On: Bilateral, Foot pump           Assisted PT during session  For all/additional information please see PT note(s)      Alvaro Altamirano Restorative Technician BS Subjective





- Date & Time of Evaluation


Date of Evaluation: 10/05/18


Time of Evaluation: 10:00





- Subjective


Subjective: 


clinically same





Objective





- Vital Signs/Intake and Output


Vital Signs (last 24 hours): 


                                        











Temp Pulse Resp BP Pulse Ox


 


 98.0 F   57 L  20   133/75   95 


 


 10/05/18 07:00  10/05/18 11:50  10/05/18 07:00  10/05/18 07:00  10/05/18 07:00








Intake and Output: 


                                        











 10/05/18 10/05/18





 06:59 18:59


 


Intake Total 480 


 


Balance 480 














- Medications


Medications: 


                               Current Medications





Amlodipine Besylate (Norvasc)  10 mg PO DAILY [facility]


   Last Admin: 10/05/18 09:08 Dose:  10 mg


Carvedilol (Coreg)  6.25 mg PO BID [facility]


   Last Admin: 10/05/18 09:08 Dose:  6.25 mg


Cinacalcet (Sensipar)  60 mg PO ACD [facility]


   Last Admin: 10/04/18 17:30 Dose:  60 mg


Famotidine (Pepcid)  20 mg PO DAILY [facility]


   Last Admin: 10/05/18 09:09 Dose:  20 mg


Heparin Sodium (Porcine) (Heparin)  2,000 units IVP TTS [facility]


   Last Admin: 10/04/18 10:00 Dose:  Not Given


Isosorbide Mononitrate (Imdur)  120 mg PO DAILY [facility]


   Last Admin: 10/05/18 09:09 Dose:  120 mg


Losartan Potassium (Cozaar)  50 mg PO DAILY [facility]


   Last Admin: 10/05/18 09:09 Dose:  50 mg


Ondansetron HCl (Zofran Tab)  4 mg PO DAILY [facility]


   Last Admin: 10/05/18 09:08 Dose:  4 mg


Vitamin B Complex/Vit C/Folic Acid (Nephro-Frankie)  1 tab PO 0800 [facility]


   Last Admin: 10/05/18 07:49 Dose:  1 tab











- Labs


Labs: 


                                        





                                 10/03/18 10:16 





                                 10/03/18 10:16 











- Constitutional


Appears: Well





- Head Exam


Head Exam: ATRAUMATIC, NORMAL INSPECTION, NORMOCEPHALIC





- Eye Exam


Eye Exam: EOMI, Normal appearance, PERRL


Pupil Exam: NORMAL ACCOMODATION, PERRL





- ENT Exam


ENT Exam: Mucous Membranes Moist, Normal Exam





- Neck Exam


Neck Exam: Full ROM, Normal Inspection.  absent: Lymphadenopathy





- Respiratory Exam


Respiratory Exam: Decreased Breath Sounds





- Cardiovascular Exam


Cardiovascular Exam: REGULAR RHYTHM, +S1, +S2





- GI/Abdominal Exam


GI & Abdominal Exam: Soft, Diminished Bowel Sounds





- Rectal Exam


Rectal Exam: Deferred

## 2018-10-05 NOTE — PCM.HF
Heart Failure Core Measure





- Heart Failure


Ejection Fraction: 40 % or Greater


ACE Inhibitor Prescribed: No


Contraindication/Reason for not providing: on arb 


Beta-Blocker Prescribed: Carvedilol


Angiotensin II Receptor Blocker Prescribed: Yes


AnticoagulationTherapy for Atrial Fibrillation/Atrialflutter: No


Contraindication/Reason for not providing: no hx of a fib 


Aldosterone Antagonist Prescribed: No


Contraindication/Reason for not providing: esrd


Hydralazine Nitrate Prescribed: No


Contraindication/Reason for not providing: ef>45





- Follow up


Will be discharged to: Home


Follow Up Date (must be within 7 days from discharge): 10/08/18

## 2018-10-05 NOTE — CP.PCM.PN
Subjective





- Date & Time of Evaluation


Date of Evaluation: 10/05/18


Time of Evaluation: 14:00





- Subjective


Subjective: 





NP NOTES





Patient seen today , awake, alert, ox3  sitting up in bed , denies any chest 

pain,sob,  headache, abdominal pain, N/V/d 


 a febrile 


seen by Dr. ROMEL patel today, cleared for discharge home today and f/u with his 

office on Monday 


discharge plan discussed with son at bed tj e


SW will contact HD center for resume HD  as scheduled out patient 








Objective





- Vital Signs/Intake and Output


Vital Signs (last 24 hours): 


                                        











Temp Pulse Resp BP Pulse Ox


 


 98.0 F   56 L  20   133/75   98 


 


 10/05/18 07:00  10/05/18 14:03  10/05/18 07:00  10/05/18 07:00  10/05/18 14:03








Intake and Output: 


                                        











 10/05/18 10/05/18





 06:59 18:59


 


Intake Total 480 


 


Balance 480 














- Medications


Medications: 


                               Current Medications





Amlodipine Besylate (Norvasc)  10 mg PO DAILY [facility]


   Last Admin: 10/05/18 09:08 Dose:  10 mg


Carvedilol (Coreg)  6.25 mg PO BID [facility]


   Last Admin: 10/05/18 09:08 Dose:  6.25 mg


Cinacalcet (Sensipar)  60 mg PO ACD [facility]


   Last Admin: 10/04/18 17:30 Dose:  60 mg


Famotidine (Pepcid)  20 mg PO DAILY [facility]


   Last Admin: 10/05/18 09:09 Dose:  20 mg


Heparin Sodium (Porcine) (Heparin)  2,000 units IVP TTS [facility]


   Last Admin: 10/04/18 10:00 Dose:  Not Given


Isosorbide Mononitrate (Imdur)  120 mg PO DAILY [facility]


   Last Admin: 10/05/18 09:09 Dose:  120 mg


Losartan Potassium (Cozaar)  50 mg PO DAILY [facility]


   Last Admin: 10/05/18 09:09 Dose:  50 mg


Ondansetron HCl (Zofran Tab)  4 mg PO DAILY [facility]


   Last Admin: 10/05/18 09:08 Dose:  4 mg


Vitamin B Complex/Vit C/Folic Acid (Nephro-Frankie)  1 tab PO 0800 [facility]


   Last Admin: 10/05/18 07:49 Dose:  1 tab











- Labs


Labs: 


                                        





                                 10/03/18 10:16 





                                 10/03/18 10:16 











Assessment and Plan





- Assessment and Plan (Free Text)


Assessment: 





A/P

## 2019-03-28 ENCOUNTER — HOSPITAL ENCOUNTER (EMERGENCY)
Dept: HOSPITAL 31 - C.ER | Age: 69
Discharge: HOME | End: 2019-03-28
Payer: MEDICARE

## 2019-03-28 VITALS
SYSTOLIC BLOOD PRESSURE: 179 MMHG | OXYGEN SATURATION: 99 % | RESPIRATION RATE: 17 BRPM | DIASTOLIC BLOOD PRESSURE: 83 MMHG | HEART RATE: 81 BPM | TEMPERATURE: 98.3 F

## 2019-03-28 VITALS — BODY MASS INDEX: 28.6 KG/M2

## 2019-03-28 DIAGNOSIS — Z99.2: ICD-10-CM

## 2019-03-28 DIAGNOSIS — I12.0: ICD-10-CM

## 2019-03-28 DIAGNOSIS — E78.00: ICD-10-CM

## 2019-03-28 DIAGNOSIS — I50.9: ICD-10-CM

## 2019-03-28 DIAGNOSIS — T82.838A: Primary | ICD-10-CM

## 2019-03-28 DIAGNOSIS — N18.6: ICD-10-CM

## 2019-03-28 LAB
ALBUMIN SERPL-MCNC: 4.4 G/DL (ref 3.5–5)
ALBUMIN/GLOB SERPL: 1.2 {RATIO} (ref 1–2.1)
ALT SERPL-CCNC: < 6 U/L (ref 9–52)
APTT BLD: 38 SECONDS (ref 21–34)
AST SERPL-CCNC: 31 U/L (ref 14–36)
BASOPHILS # BLD AUTO: 0 K/UL (ref 0–0.2)
BASOPHILS # BLD AUTO: 0 K/UL (ref 0–0.2)
BASOPHILS NFR BLD: 0.5 % (ref 0–2)
BASOPHILS NFR BLD: 0.6 % (ref 0–2)
BUN SERPL-MCNC: 11 MG/DL (ref 7–17)
CALCIUM SERPL-MCNC: 9.3 MG/DL (ref 8.6–10.4)
EOSINOPHIL # BLD AUTO: 0 K/UL (ref 0–0.7)
EOSINOPHIL # BLD AUTO: 0.1 K/UL (ref 0–0.7)
EOSINOPHIL NFR BLD: 0.7 % (ref 0–4)
EOSINOPHIL NFR BLD: 1.9 % (ref 0–4)
ERYTHROCYTE [DISTWIDTH] IN BLOOD BY AUTOMATED COUNT: 16.4 % (ref 11.5–14.5)
ERYTHROCYTE [DISTWIDTH] IN BLOOD BY AUTOMATED COUNT: 16.5 % (ref 11.5–14.5)
GFR NON-AFRICAN AMERICAN: 13
HGB BLD-MCNC: 11.3 G/DL (ref 11–16)
HGB BLD-MCNC: 11.7 G/DL (ref 11–16)
INR PPP: 1.1
LYMPHOCYTES # BLD AUTO: 1 K/UL (ref 1–4.3)
LYMPHOCYTES # BLD AUTO: 1.1 K/UL (ref 1–4.3)
LYMPHOCYTES NFR BLD AUTO: 15.2 % (ref 20–40)
LYMPHOCYTES NFR BLD AUTO: 26.7 % (ref 20–40)
MCH RBC QN AUTO: 27.2 PG (ref 27–31)
MCH RBC QN AUTO: 27.2 PG (ref 27–31)
MCHC RBC AUTO-ENTMCNC: 32.1 G/DL (ref 33–37)
MCHC RBC AUTO-ENTMCNC: 32.1 G/DL (ref 33–37)
MCV RBC AUTO: 84.8 FL (ref 81–99)
MCV RBC AUTO: 84.9 FL (ref 81–99)
MONOCYTES # BLD: 0.2 K/UL (ref 0–0.8)
MONOCYTES # BLD: 0.5 K/UL (ref 0–0.8)
MONOCYTES NFR BLD: 5.6 % (ref 0–10)
MONOCYTES NFR BLD: 7.5 % (ref 0–10)
NEUTROPHILS # BLD: 2.8 K/UL (ref 1.8–7)
NEUTROPHILS # BLD: 4.9 K/UL (ref 1.8–7)
NEUTROPHILS NFR BLD AUTO: 65.3 % (ref 50–75)
NEUTROPHILS NFR BLD AUTO: 76 % (ref 50–75)
NRBC BLD AUTO-RTO: 0 % (ref 0–2)
NRBC BLD AUTO-RTO: 0 % (ref 0–2)
PLATELET # BLD: 133 K/UL (ref 130–400)
PLATELET # BLD: 144 K/UL (ref 130–400)
PMV BLD AUTO: 9.3 FL (ref 7.2–11.7)
PMV BLD AUTO: 9.7 FL (ref 7.2–11.7)
PROTHROMBIN TIME: 11.7 SECONDS (ref 9.7–12.2)
RBC # BLD AUTO: 4.15 MIL/UL (ref 3.8–5.2)
RBC # BLD AUTO: 4.3 MIL/UL (ref 3.8–5.2)
WBC # BLD AUTO: 4.2 K/UL (ref 4.8–10.8)
WBC # BLD AUTO: 6.4 K/UL (ref 4.8–10.8)

## 2019-03-28 NOTE — CP.PCM.CON
History of Present Illness





- History of Present Illness


History of Present Illness: 





SURGERY CONSULT NOTE FOR DR. CORTES





Reason: Bleeding AVF





69F presents to ER from dialysis with bleeding puncture site. She denies any 

dizziness or lightheadedness. She states the bleeding has been continuous since 

the dialysis.





PMH: CHF, HTN, Hypercholesterolemia, End Stage Renal Disease (on hemodialysis), 

Chronic Kidney Disease


PSH: Left AVF 10 years ago. 





Past Patient History





- Infectious Disease


Hx of Infectious Diseases: None





- Past Medical History & Family History


Past Medical History?: Yes





- Past Social History


Smoking Status: Never Smoked





- CARDIAC


Hx Congestive Heart Failure: Yes


Hx Hypercholesterolemia: Yes


Hx Hypertension: Yes





- PULMONARY


Hx Respiratory Disorders: No





- NEUROLOGICAL


Hx Neurological Disorder: No





- HEENT


Hx HEENT Problems: No





- RENAL


Hx Chronic Kidney Disease: Yes





- ENDOCRINE/METABOLIC


Hx Endocrine Disorders: No





- HEMATOLOGICAL/ONCOLOGICAL


Hx Blood Transfusions: Yes





- INTEGUMENTARY


Hx Dermatological Problems: No





- MUSCULOSKELETAL/RHEUMATOLOGICAL


Hx Musculoskeletal Disorders: Yes


Hx Falls: Yes (unstable)





- GASTROINTESTINAL


Hx Gastrointestinal Disorders: No





- GENITOURINARY/GYNECOLOGICAL


Hx Genitourinary Disorders: No


Other/Comment: hd





- PSYCHIATRIC


Hx Substance Use: No





- SURGICAL HISTORY


Hx Surgeries: Yes


Hx Kidney Transplant: Yes


Other/Comment: KIDNEY TRANSPLANT.  LEFT ARM AV SHUNT





- ANESTHESIA


Hx Anesthesia: Yes


Hx Anesthesia Reactions: No


Hx Malignant Hyperthermia: No





Meds


Allergies/Adverse Reactions: 


                                    Allergies











Allergy/AdvReac Type Severity Reaction Status Date / Time


 


Penicillins Allergy Severe RASH Verified 10/01/18 15:22














Physical Exam





- Constitutional


Appears: Non-toxic, No Acute Distress





- Eye Exam


Eye Exam: EOMI, PERRL





- ENT Exam


ENT Exam: Mucous Membranes Moist





- Respiratory Exam


Respiratory Exam: Clear to Auscultation Bilateral, NORMAL BREATHING PATTERN





- Cardiovascular Exam


Cardiovascular Exam: REGULAR RHYTHM, +S1, +S2





- GI/Abdominal Exam


GI & Abdominal Exam: Soft.  absent: Distended, Firm, Guarding, Rebound, Rigid, 

Tenderness





- Extremities Exam


Extremities exam: Negative for: pedal edema, tenderness


Additional comments: 





Left arm bleeding AVF, Puncture site continuous projecting bleed





- Neurological Exam


Neurological exam: Alert, Oriented x3





- Psychiatric Exam


Psychiatric exam: Normal Affect, Normal Mood





- Skin


Skin Exam: Dry, Intact, Normal Color, Warm





Results





- Vital Signs


Recent Vital Signs: 


                                Last Vital Signs











Temp  98.2 F   03/28/19 17:13


 


Pulse  70   03/28/19 17:13


 


Resp  20   03/28/19 17:13


 


BP  195/82 H  03/28/19 17:13


 


Pulse Ox  98   03/28/19 17:13














- Labs


Result Diagrams: 


                                 03/28/19 15:51





                                 03/28/19 15:51


Labs: 


                         Laboratory Results - last 24 hr











  03/28/19 03/28/19 03/28/19





  15:51 15:51 15:51


 


WBC  4.2 L  


 


RBC  4.30  


 


Hgb  11.7  


 


Hct  36.4  


 


MCV  84.8  


 


MCH  27.2  


 


MCHC  32.1 L  


 


RDW  16.5 H  


 


Plt Count  144  


 


MPV  9.7  


 


Neut % (Auto)  65.3  


 


Lymph % (Auto)  26.7  


 


Mono % (Auto)  5.6  


 


Eos % (Auto)  1.9  


 


Baso % (Auto)  0.5  


 


Neut # (Auto)  2.8  


 


Lymph # (Auto)  1.1  


 


Mono # (Auto)  0.2  


 


Eos # (Auto)  0.1  


 


Baso # (Auto)  0.0  


 


PT   11.7 


 


INR   1.1 


 


APTT   38 H 


 


Sodium    136


 


Potassium    3.6


 


Chloride    92 L


 


Carbon Dioxide    35 H


 


Anion Gap    12


 


BUN    11


 


Creatinine    3.5 H


 


Est GFR ( Amer)    16


 


Est GFR (Non-Af Amer)    13


 


Random Glucose    90


 


Calcium    9.3


 


Total Bilirubin    1.2


 


AST    31


 


ALT    < 6 L D


 


Alkaline Phosphatase    116


 


Total Protein    8.0


 


Albumin    4.4


 


Globulin    3.6


 


Albumin/Globulin Ratio    1.2


 


Blood Type   


 


Antibody Screen   














  03/28/19





  16:17


 


WBC 


 


RBC 


 


Hgb 


 


Hct 


 


MCV 


 


MCH 


 


MCHC 


 


RDW 


 


Plt Count 


 


MPV 


 


Neut % (Auto) 


 


Lymph % (Auto) 


 


Mono % (Auto) 


 


Eos % (Auto) 


 


Baso % (Auto) 


 


Neut # (Auto) 


 


Lymph # (Auto) 


 


Mono # (Auto) 


 


Eos # (Auto) 


 


Baso # (Auto) 


 


PT 


 


INR 


 


APTT 


 


Sodium 


 


Potassium 


 


Chloride 


 


Carbon Dioxide 


 


Anion Gap 


 


BUN 


 


Creatinine 


 


Est GFR ( Amer) 


 


Est GFR (Non-Af Amer) 


 


Random Glucose 


 


Calcium 


 


Total Bilirubin 


 


AST 


 


ALT 


 


Alkaline Phosphatase 


 


Total Protein 


 


Albumin 


 


Globulin 


 


Albumin/Globulin Ratio 


 


Blood Type  B POSITIVE


 


Antibody Screen  Negative














Assessment & Plan





- Assessment and Plan (Free Text)


Assessment: 





69F with left AVF bleeding puncture site


Plan: 





- Placed a figure of 8 stitch with 6-0 prolene, Bleeding controlled





Further recs discuss with Dr. Stella Coates, PGY3

## 2019-03-28 NOTE — C.PDOC
History Of Present Illness


69 year old female brought via BLS with complaint of bleeding from her fistula 

on her left upper arm after dialysis. EMS applied tourniquet-like device to the 

affected area and stopped bleeding. Patient has no other physical complaints. 





Time Seen by Provider: 03/28/19 15:32


Chief Complaint (Nursing): Abnormal Skin Integrity


History Per: Patient


History/Exam Limitations: no limitations


Onset/Duration Of Symptoms: Hrs


Current Symptoms Are (Timing): Still Present


Location Of Injury: Left: Arm (bleeding from fistula at the upper arm)





Past Medical History


Reviewed: Historical Data, Nursing Documentation, Vital Signs


Vital Signs: 





                                Last Vital Signs











Temp  97.9 F   03/28/19 15:31


 


Pulse  70   03/28/19 15:31


 


Resp  18   03/28/19 15:31


 


BP  173/89 H  03/28/19 15:31


 


Pulse Ox  100   03/28/19 15:31














- Medical History


PMH: CHF, HTN, Hypercholesterolemia, End Stage Renal Disease (on hemodialysis), 

Chronic Kidney Disease


Surgical History: No Surg Hx





- [facility] Procedures











 (10/01/18)


ASSISTANCE WITH RESPIRATORY VENTILATION, 24-96 HRS, CPAP (07/03/18)


ASSISTANCE WITH RESPIRATORY VENTILATION, <24 HRS, CPAP (03/21/16)


PERFORMANCE OF URINARY FILTRATION, SINGLE (03/21/16)








Family History: States: Unknown Family Hx





- Social History


Hx Tobacco Use: No


Hx Alcohol Use: No


Hx Substance Use: No





- Immunization History


Hx Tetanus Toxoid Vaccination: Yes


Hx Influenza Vaccination: Yes


Hx Pneumococcal Vaccination: Yes





Review Of Systems


Constitutional: Negative for: Fever, Chills, Weakness


Musculoskeletal: Positive for: Arm Pain (bleeding from fistula on the left upper

arm)


Neurological: Negative for: Weakness, Numbness, Dizziness





Physical Exam





- Physical Exam


Appears: Non-toxic, No Acute Distress


Skin: Normal Color, Warm, Dry


Head: Atraumatic, Normacephalic


Neck: Normal ROM, Supple


Chest: Symmetrical, No Deformity


Cardiovascular: Rhythm Regular, No Murmur


Respiratory: No Accessory Muscle Use


Gastrointestinal/Abdominal: Soft, No Tenderness


Extremity: Other (active,pulsating blood from fistula on the left upper 

extremity)


Neurological/Psych: Oriented x3, Normal Speech, Normal Cognition





ED Course And Treatment





- Laboratory Results


Result Diagrams: 


                                 03/28/19 18:40





                                 03/28/19 15:51


Lab Results: 





                                        











PT  11.7 SECONDS (9.7-12.2)   03/28/19  15:51    


 


INR  1.1   03/28/19  15:51    


 


APTT  38 SECONDS (21-34)  H  03/28/19  15:51    








                                        











Total Bilirubin  1.2 mg/dL (0.2-1.3)   03/28/19  15:51    


 


AST  31 U/L (14-36)   03/28/19  15:51    


 


ALT  < 6 U/L (9-52)  L D 03/28/19  15:51    


 


Alkaline Phosphatase  116 U/L ()   03/28/19  15:51    


 


Total Protein  8.0 g/dL (6.3-8.3)   03/28/19  15:51    


 


Albumin  4.4 g/dL (3.5-5.0)   03/28/19  15:51    


 


Globulin  3.6 gm/dL (2.2-3.9)   03/28/19  15:51    


 


Albumin/Globulin Ratio  1.2  (1.0-2.1)   03/28/19  15:51    











O2 Sat by Pulse Oximetry: 100 (in RA)





Medical Decision Making


Medical Decision Making: 


Impression: 69 year old female brought via BLS with complaint of bleeding from 

her fistula on her left upper arm after dialysis





Plan:


Labs ordered with type and screen, CMP, and CBC. 





1905  pt with repeat cbc with no decrease. no active bleeding since wound 

sutured by surgery. d/c home. 





Disposition


Counseled Patient/Family Regarding: Studies Performed, Diagnosis, Need For 

Followup





- Disposition


Referrals: 


Rosina Cano MD [Staff Provider] - 


Disposition: HOME/ ROUTINE


Disposition Time: 19:08


Condition: GOOD


Additional Instructions: 


Keep bandage on until tomorrow.  Follow up with Dr Cano tomorrow.  Return t ER 

for any worsening symptoms. 


Instructions:  Arteriovenous Fistula for Dialysis (DC)


Forms:  [facility] (English)





- Clinical Impression


Clinical Impression: 


 Hemorrhage of surgically-created arteriovenous fistula








- PA / NP / Resident Statement


MD/DO has reviewed & agrees with the documentation as recorded. (Marissa Jasso)





- Scribe Statement


The provider has reviewed the documentation as recorded by the Scribe (Marissa Jasso)








All medical record entries made by the Scribe were at my direction and 

personally dictated by me. I have reviewed the chart and agree that the record 

accurately reflects my personal performance of the history, physical exam, 

medical decision making, and the department course for this patient. I have also

personally directed, reviewed, and agree with the discharge instructions and 

disposition.